# Patient Record
Sex: FEMALE | Race: WHITE | Employment: PART TIME | URBAN - METROPOLITAN AREA
[De-identification: names, ages, dates, MRNs, and addresses within clinical notes are randomized per-mention and may not be internally consistent; named-entity substitution may affect disease eponyms.]

---

## 2017-01-24 ENCOUNTER — TELEPHONE (OUTPATIENT)
Dept: FAMILY MEDICINE | Facility: CLINIC | Age: 52
End: 2017-01-24

## 2017-01-24 DIAGNOSIS — F41.9 ANXIETY: Primary | ICD-10-CM

## 2017-01-25 NOTE — TELEPHONE ENCOUNTER
Ativan      Last Written Prescription Date: 09/22/2016  Last Fill Quantity: 30, # refills: 1  Last Office Visit with [facility] primary care provider:  10/21/2016        Last PHQ-9 score on record=   PHQ-9 SCORE 10/21/2016   Total Score -   Total Score -   Total Score 2

## 2017-01-27 RX ORDER — LORAZEPAM 0.5 MG/1
TABLET ORAL
Qty: 30 TABLET | Refills: 0 | Status: SHIPPED | OUTPATIENT
Start: 2017-01-27 | End: 2017-04-07

## 2017-01-27 NOTE — TELEPHONE ENCOUNTER
Routing refill request to provider for review/approval because:  Drug not on the [facility] refill protocol         Disp Refills Start End AKILAH     LORazepam (ATIVAN) 0.5 MG tablet 30 tablet 1 9/22/2016  No     Sig: TAKE ONE-HALF TO ONE TABLET BY MOUTH EVERY EIGHT HOURS AS NEEDED FOR ANXIETY.     Class: Local Print     Order: [id number]       Pharmacy      [id number] - [place], [place] - [address]       Associated Diagnoses      Anxiety [F41.9]  - Primary

## 2017-03-16 ENCOUNTER — ALLIED HEALTH/NURSE VISIT (OUTPATIENT)
Dept: NURSING | Facility: CLINIC | Age: 52
End: 2017-03-16
Payer: COMMERCIAL

## 2017-03-16 VITALS
SYSTOLIC BLOOD PRESSURE: 138 MMHG | BODY MASS INDEX: 25.49 KG/M2 | HEIGHT: 61 IN | WEIGHT: 135 LBS | DIASTOLIC BLOOD PRESSURE: 90 MMHG | HEART RATE: 80 BPM

## 2017-03-16 DIAGNOSIS — I10 HTN (HYPERTENSION): Primary | ICD-10-CM

## 2017-03-16 PROCEDURE — 99207 ZZC NO CHARGE NURSE ONLY: CPT

## 2017-03-16 NOTE — MR AVS SNAPSHOT
After Visit Summary   3/16/2017    iVlma Barber    MRN: [id number]           Patient Information     Date Of Birth          1965        Visit Information        Provider Department      3/16/2017 2:00 PM NE RN [facility] Instructions    Limiting your intake of salt (sodium) to 1,500-2,000mg per day will help lower your blood pressure.  One great way to do that is using spices for flavor and reading labels to spot hidden sodium in processed and canned foods.  For more information and some great recipe ideas you can visit MRS. KRISHNAMURTHY  salt free seasoning at http://www.[employer]/?s_cid=c3a:google:brand:mrs+dash  (She even has a FACEBOOK page).    We also talked about portion control and how adding more whole grains, fresh fruits and vegetables can help you lose weight which has a direct effect on lowering your blood pressure.  Try taking the  Portion Distortion Quiz  at http://nm9412.nhlbihin.net/portion/portion.cgi?action=question&number=1 it s only 11 questions.    Think of meat as a side dish rather than the main course by loading up on veggies and healthy grains.  You ll feel full sooner and longer.  This is a great opportunity to try new vegetables and preparation methods.  Have fun with this!  Stress and how by itself has not been proved to cause long-term high blood pressure, other behaviors linked to stress - such as overeating, drinking alcohol and poor sleep habits - can cause high blood pressure.      Stressful events are a fact of life and you may not be able to change your current situation.  Identifying what stresses you out and how you respond to that stress is the key to learning how you can take care of yourself physically and emotionally.  Check out the American Heart Association for more useful information at...   http://www.americanheart.org/presenter.jhtml?ygybophlfx=6030900    Eating a healthy diet, getting some exercise, using relaxation  "techniques, proper sleep, and counseling are ways to help decrease stress in your life.  Be good to yourself!  Home Remedy Treatments for Insomnia  Sleepless night after sleepless night can be downright unbearable. Discover some helpful home remedies for getting the sleep you need tonight -- and every night.  Don't torture yourself. The worst thing that an insomniac can do is to lie in bed tossing and turning. If you can't fall asleep after 15 to 20 minutes, get up and do a quiet activity, such as reading, watching TV, or listening to relaxing music. Then, go back to bed and try again.  Take a hot bath.  A 20-30 minute hot bath taken two hours before bedtime is a wonderful way to relax your body and make it ready for sleep. For most people, taking a bath closer to bedtime may be stimulating and may delay sleep (of course, there are always exceptions, so experiment with the timing if you need to).    Your temperature naturally dips at night, starting two hours before sleep and bottoming out at 4 a.m. or 5 a.m., according to a 1997 study conducted by [facility]. When you soak in a hot tub, your temperature rises--and the rapid cool-down period immediately afterward relaxes you.     Katey Antonio, PhD,  at [employer] says, \"If you raise your temperature a degree or two with a bath, the steeper drop at bedtime is more likely to put you in a deep sleep,\" A shower is less effective but can work, as well.    Try to maintain a normal schedule. Perhaps the most important rule for people with insomnia is to keep a strict sleep-wake schedule, even on weekends. If you can't sleep one night, get up at your usual time the next morning and don't take any naps. Chances are you'll be ready for a sound sleep by the next night.  Say no to naps. If you nap, you'll have more trouble getting to sleep the next night, thereby compounding your insomnia. It's best " to let yourself get good and sleepy so that it will be easier to get to sleep the next night.  Try earplugs. Sometimes, insomnia is caused by being awakened repeatedly by loud noises. Often, the sleeper is not aware of what awakened them. Try sleeping in a quieter room, or wear earplugs.  Exercise. Doing aerobic exercise, such as walking, cycling, jogging, or swimming, helps with sleep. Don't exercise too close to bedtime, though -- exercising in the morning or afternoon is best.  Get a comfortable bed and pillows. Sleep may elude you if your bed is too hard or too soft, or if your pillows aren't just right.  Don't drink alcohol. Although alcohol can make you feel drowsy and may actually put you to sleep, it has the unpleasant side effect of waking you up later on in the night with a headache, stomachache, or full bladder. In addition, once alcohol's sedative effect wears off, there's a rebound effect that actually makes you more likely to have trouble falling back to sleep.  Cut down on caffeine. Caffeine, by its nature, stimulates your brain. Limit your coffee intake to two cups a day. Starting at noon, consume no foods or beverages that contain caffeine.  Don't switch beds or move to the couch. It is important to associate your bed, and only your bed, with sleep.  Confine work to the office. Use your bedroom only for sleep and sex. No work, no eating, no television, and no arguing with your bed partner.             Establish a relaxing bedtime ritual. When mothers bathe their children or read to them every night before bedtime, they are reinforcing a signal that it's time to settle down and get ready for sleep. Establishing such a ritual may also be helpful for adults.  Prepare your bedroom for sleep. The best sleep environment is one that is dark, quiet, comfortable, and cool, according to the National Sleep Foundation.  Don't eat before bed. Finish eating two or three hours before bedtime.  Physical activity  not only helps control your blood pressure but also helps manage your weight, strengthen your heart and manage your stress level.  Any activity you can add to your day is helpful.  American Heart Association has some good suggestions at http://www.americanheart.org/presenter.jhtml?vfenfcsrkl=4613714    Ideally you will work your way up to 30 minutes of moderate exercise - such as brisk walking - per day, at least 5 days a week.  If you want to take three 10 minute walks, or two 15 minute brisk walks, per day that is ok.  You re working too hard if you get out of breath quickly and have to stop to catch your breath or short sentences feel like a strain.    Warming up before and cooling down after exercising helps decrease your risk of injury and soreness.  A good rule of thumb to remember is,  Do slowly what you are about to do quickly.   Don't forget to breathe!  Holding your breath can increase your blood pressure.  Find your rhythm!          Follow-ups after your visit        Your next 10 appointments already scheduled     Mar 17, 2017  9:45 AM CDT   SHORT with Alix Ortiz MD   [facility] ([facility]    [address] [place] [id number] [phone]              Who to contact     If you have questions or need follow up information about today's clinic visit or your schedule please contact [facility] directly at [phone].  Normal or non-critical lab and imaging results will be communicated to you by MyChart, letter or phone within 4 business days after the clinic has received the results. If you do not hear from us within 7 days, please contact the clinic through MyChart or phone. If you have a critical or abnormal lab result, we will notify you by phone as soon as possible.  Submit refill requests through [facility] or call your pharmacy and they will forward the refill request to us. Please allow 3 business days for your refill to be completed.  "         Additional Information About Your Visit        MyChart Information     [facility] lets you send messages to your doctor, view your test results, renew your prescriptions, schedule appointments and more. To sign up, go to [email or web address] . Click on \"Log in\" on the left side of the screen, which will take you to the Welcome page. Then click on \"Sign up Now\" on the right side of the page.     You will be asked to enter the access code listed below, as well as some personal information. Please follow the directions to create your username and password.     Your access code is: [email or web address]  Expires: 2017  2:28 PM     Your access code will  in 90 days. If you need help or a new code, please call your [facility] clinic or [phone].        Care EveryWhere ID     This is your Care EveryWhere ID. This could be used by other organizations to access your [facility] medical records  [id number]        Your Vitals Were     Pulse Height BMI (Body Mass Index)             80 5' 1.02\" (1.55 m) 25.49 kg/m2          Blood Pressure from Last 3 Encounters:   17 138/90   10/21/16 122/88   16 118/78    Weight from Last 3 Encounters:   17 135 lb (61.2 kg)   10/21/16 142 lb 12.8 oz (64.8 kg)   16 142 lb (64.4 kg)              Today, you had the following     No orders found for display       Primary Care Provider Office Phone # Fax #    Angela RODGER Hernandez [facility] [phone]       [address] [address]        Thank you!     Thank you for choosing [facility]  for your care. Our goal is always to provide you with excellent care. Hearing back from our patients is one way we can continue to improve our services. Please take a few minutes to complete the written survey that you may receive in the mail after your visit with us. Thank you!             Your Updated Medication List - Protect others around you: Learn " how to safely use, store and throw away your medicines at www.disposemymeds.org.          This list is accurate as of: 3/16/17  2:29 PM.  Always use your most recent med list.                   Brand Name Dispense Instructions for use    aspirin 81 MG tablet      Take 81 mg by mouth daily       cyclobenzaprine 10 MG tablet    FLEXERIL    90 tablet    Take 0.5 tablets (5 mg) by mouth nightly as needed for muscle spasms       DULoxetine 20 MG EC capsule    CYMBALTA    60 capsule    Take 1 capsule (20 mg) by mouth 2 times daily       hydrochlorothiazide 12.5 MG Tabs tablet     90 tablet    Take 1 tablet (12.5 mg) by mouth daily       isometheptene-dichloralphenazone-acetaminophen -325 MG per capsule    MIDRIN    90 capsule    Take 1 capsule by mouth every 4 hours as needed for headaches       LORazepam 0.5 MG tablet    ATIVAN    30 tablet    TAKE 1/2 TO 1 TABLET BY MOUTH EVERY 8 HOURS AS NEEDED FOR ANXIETY       omeprazole 20 MG CR capsule    priLOSEC    90 capsule    Take 1 capsule (20 mg) by mouth daily       tiZANidine 2 MG tablet    ZANAFLEX    30 tablet    Take 1-2 tablets (2-4 mg) by mouth 3 times daily as needed for muscle spasms

## 2017-03-16 NOTE — Clinical Note
Patient comes in concerned about her blood pressure, numbers at home elevated 145/98 she has some anxiety going on, worries, tired, unable to sleep apt made with you for 9:45 tomorrow. Keri Ruiz,Clinic Rn [facility] [place]

## 2017-03-16 NOTE — PATIENT INSTRUCTIONS
Limiting your intake of salt (sodium) to 1,500-2,000mg per day will help lower your blood pressure.  One great way to do that is using spices for flavor and reading labels to spot hidden sodium in processed and canned foods.  For more information and some great recipe ideas you can visit MRS. KRISHNAMURTHY  salt free seasoning at http://www.[employer]/?s_cid=c3a:google:brand:mrs+dash  (She even has a FACEBOOK page).    We also talked about portion control and how adding more whole grains, fresh fruits and vegetables can help you lose weight which has a direct effect on lowering your blood pressure.  Try taking the  Portion Distortion Quiz  at http://da8996.nhlbihin.net/portion/portion.cgi?action=question&number=1 it s only 11 questions.    Think of meat as a side dish rather than the main course by loading up on veggies and healthy grains.  You ll feel full sooner and longer.  This is a great opportunity to try new vegetables and preparation methods.  Have fun with this!  Stress and how by itself has not been proved to cause long-term high blood pressure, other behaviors linked to stress - such as overeating, drinking alcohol and poor sleep habits - can cause high blood pressure.      Stressful events are a fact of life and you may not be able to change your current situation.  Identifying what stresses you out and how you respond to that stress is the key to learning how you can take care of yourself physically and emotionally.  Check out the American Heart Association for more useful information at...   http://www.americanheart.org/presenter.jhtml?mcyrbzzigg=0664188    Eating a healthy diet, getting some exercise, using relaxation techniques, proper sleep, and counseling are ways to help decrease stress in your life.  Be good to yourself!  Home Remedy Treatments for Insomnia  Sleepless night after sleepless night can be downright unbearable. Discover some helpful home remedies for getting the sleep you need tonight -- and  "every night.  Don't torture yourself. The worst thing that an insomniac can do is to lie in bed tossing and turning. If you can't fall asleep after 15 to 20 minutes, get up and do a quiet activity, such as reading, watching TV, or listening to relaxing music. Then, go back to bed and try again.  Take a hot bath.  A 20-30 minute hot bath taken two hours before bedtime is a wonderful way to relax your body and make it ready for sleep. For most people, taking a bath closer to bedtime may be stimulating and may delay sleep (of course, there are always exceptions, so experiment with the timing if you need to).    Your temperature naturally dips at night, starting two hours before sleep and bottoming out at 4 a.m. or 5 a.m., according to a 1997 study conducted by [facility]. When you soak in a hot tub, your temperature rises--and the rapid cool-down period immediately afterward relaxes you.     Katey Antonio, PhD,  at [employer] says, \"If you raise your temperature a degree or two with a bath, the steeper drop at bedtime is more likely to put you in a deep sleep,\" A shower is less effective but can work, as well.    Try to maintain a normal schedule. Perhaps the most important rule for people with insomnia is to keep a strict sleep-wake schedule, even on weekends. If you can't sleep one night, get up at your usual time the next morning and don't take any naps. Chances are you'll be ready for a sound sleep by the next night.  Say no to naps. If you nap, you'll have more trouble getting to sleep the next night, thereby compounding your insomnia. It's best to let yourself get good and sleepy so that it will be easier to get to sleep the next night.  Try earplugs. Sometimes, insomnia is caused by being awakened repeatedly by loud noises. Often, the sleeper is not aware of what awakened them. Try sleeping in a quieter room, or wear " earplugs.  Exercise. Doing aerobic exercise, such as walking, cycling, jogging, or swimming, helps with sleep. Don't exercise too close to bedtime, though -- exercising in the morning or afternoon is best.  Get a comfortable bed and pillows. Sleep may elude you if your bed is too hard or too soft, or if your pillows aren't just right.  Don't drink alcohol. Although alcohol can make you feel drowsy and may actually put you to sleep, it has the unpleasant side effect of waking you up later on in the night with a headache, stomachache, or full bladder. In addition, once alcohol's sedative effect wears off, there's a rebound effect that actually makes you more likely to have trouble falling back to sleep.  Cut down on caffeine. Caffeine, by its nature, stimulates your brain. Limit your coffee intake to two cups a day. Starting at noon, consume no foods or beverages that contain caffeine.  Don't switch beds or move to the couch. It is important to associate your bed, and only your bed, with sleep.  Confine work to the office. Use your bedroom only for sleep and sex. No work, no eating, no television, and no arguing with your bed partner.             Establish a relaxing bedtime ritual. When mothers bathe their children or read to them every night before bedtime, they are reinforcing a signal that it's time to settle down and get ready for sleep. Establishing such a ritual may also be helpful for adults.  Prepare your bedroom for sleep. The best sleep environment is one that is dark, quiet, comfortable, and cool, according to the National Sleep Foundation.  Don't eat before bed. Finish eating two or three hours before bedtime.  Physical activity not only helps control your blood pressure but also helps manage your weight, strengthen your heart and manage your stress level.  Any activity you can add to your day is helpful.  American Heart Association has some good suggestions at  http://www.americanheart.org/presenter.jhtml?qfyaspspdy=8529494    Ideally you will work your way up to 30 minutes of moderate exercise - such as brisk walking - per day, at least 5 days a week.  If you want to take three 10 minute walks, or two 15 minute brisk walks, per day that is ok.  You re working too hard if you get out of breath quickly and have to stop to catch your breath or short sentences feel like a strain.    Warming up before and cooling down after exercising helps decrease your risk of injury and soreness.  A good rule of thumb to remember is,  Do slowly what you are about to do quickly.   Don't forget to breathe!  Holding your breath can increase your blood pressure.  Find your rhythm!

## 2017-03-16 NOTE — PROGRESS NOTES
DATA: PCP: Angel Dover    Indications for Blood Pressure (BP) monitoring:     High readings.    ACTION: Signs and Symptoms:  Headaches?: no  Chest pain?: tight like anxiety   Shortness of breath?: little bit   Edema?: no  Visual problems?: no  Parathesia?: no  Epitaxis?: no  Dizziness?: off and on   Hematuria?: no    BP:   BP Readings from Last 1 Encounters:   10/21/16 122/88     Data Unavailable     Diabetic?: no  Heart Disease?: no  Smoking?: no  Alcohol usage?: no  Low sodium diet?: no  Exercise?: no  Checks blood pressure at home?: yes    Current 3 readings: 145/98 137/91  *BP is 130/80 or greater for diabetics or heart disease? no  *BP is 140/90 or greater for non-diabetics? no  *Pulse under 60? no  *Pulse over 110? no    Discussed with patient symptoms of high blood pressure, best ways to measure blood pressure, how blood pressure affects health, risk factors for high blood pressure, and lifestyle changes to help prevent/control high blood pressure.        RESPONSE: Patient verbalizes understanding, denies questions or concerns, and agrees with plan.         PLAN: Patient left in ambulatory condition. Will call or follow-up in clinic during the interim as needed.        CC: Chart to Dr Ortiz                          145/98

## 2017-03-17 ENCOUNTER — OFFICE VISIT (OUTPATIENT)
Dept: FAMILY MEDICINE | Facility: CLINIC | Age: 52
End: 2017-03-17
Payer: COMMERCIAL

## 2017-03-17 VITALS
BODY MASS INDEX: 24.66 KG/M2 | TEMPERATURE: 97.1 F | OXYGEN SATURATION: 100 % | HEIGHT: 62 IN | SYSTOLIC BLOOD PRESSURE: 138 MMHG | RESPIRATION RATE: 14 BRPM | HEART RATE: 66 BPM | WEIGHT: 134 LBS | DIASTOLIC BLOOD PRESSURE: 84 MMHG

## 2017-03-17 DIAGNOSIS — R10.11 ABDOMINAL PAIN, RIGHT UPPER QUADRANT: ICD-10-CM

## 2017-03-17 DIAGNOSIS — F41.9 ANXIETY: ICD-10-CM

## 2017-03-17 DIAGNOSIS — E61.1 IRON DEFICIENCY: ICD-10-CM

## 2017-03-17 DIAGNOSIS — Z13.6 CARDIOVASCULAR SCREENING; LDL GOAL LESS THAN 160: ICD-10-CM

## 2017-03-17 DIAGNOSIS — Z12.31 VISIT FOR SCREENING MAMMOGRAM: ICD-10-CM

## 2017-03-17 DIAGNOSIS — G43.C0 PERIODIC HEADACHE SYNDROME, NOT INTRACTABLE: ICD-10-CM

## 2017-03-17 DIAGNOSIS — E80.4 GILBERT'S DISEASE: ICD-10-CM

## 2017-03-17 DIAGNOSIS — I10 BENIGN ESSENTIAL HYPERTENSION: Primary | ICD-10-CM

## 2017-03-17 LAB
ALBUMIN SERPL-MCNC: 4.1 G/DL (ref 3.4–5)
ALP SERPL-CCNC: 46 U/L (ref 40–150)
ALT SERPL W P-5'-P-CCNC: 25 U/L (ref 0–50)
ANION GAP SERPL CALCULATED.3IONS-SCNC: 8 MMOL/L (ref 3–14)
AST SERPL W P-5'-P-CCNC: 12 U/L (ref 0–45)
BASOPHILS # BLD AUTO: 0 10E9/L (ref 0–0.2)
BASOPHILS NFR BLD AUTO: 0.2 %
BILIRUB SERPL-MCNC: 1.8 MG/DL (ref 0.2–1.3)
BUN SERPL-MCNC: 13 MG/DL (ref 7–30)
CALCIUM SERPL-MCNC: 9.6 MG/DL (ref 8.5–10.1)
CHLORIDE SERPL-SCNC: 101 MMOL/L (ref 94–109)
CHOLEST SERPL-MCNC: 176 MG/DL
CO2 SERPL-SCNC: 32 MMOL/L (ref 20–32)
CREAT SERPL-MCNC: 0.6 MG/DL (ref 0.52–1.04)
DIFFERENTIAL METHOD BLD: NORMAL
EOSINOPHIL # BLD AUTO: 0.1 10E9/L (ref 0–0.7)
EOSINOPHIL NFR BLD AUTO: 1 %
ERYTHROCYTE [DISTWIDTH] IN BLOOD BY AUTOMATED COUNT: 12.2 % (ref 10–15)
GFR SERPL CREATININE-BSD FRML MDRD: ABNORMAL ML/MIN/1.7M2
GLUCOSE SERPL-MCNC: 93 MG/DL (ref 70–99)
HCT VFR BLD AUTO: 36 % (ref 35–47)
HDLC SERPL-MCNC: 52 MG/DL
HGB BLD-MCNC: 12.5 G/DL (ref 11.7–15.7)
LDLC SERPL CALC-MCNC: 85 MG/DL
LYMPHOCYTES # BLD AUTO: 1.9 10E9/L (ref 0.8–5.3)
LYMPHOCYTES NFR BLD AUTO: 39.1 %
MCH RBC QN AUTO: 30.3 PG (ref 26.5–33)
MCHC RBC AUTO-ENTMCNC: 34.7 G/DL (ref 31.5–36.5)
MCV RBC AUTO: 87 FL (ref 78–100)
MONOCYTES # BLD AUTO: 0.4 10E9/L (ref 0–1.3)
MONOCYTES NFR BLD AUTO: 8.9 %
NEUTROPHILS # BLD AUTO: 2.4 10E9/L (ref 1.6–8.3)
NEUTROPHILS NFR BLD AUTO: 50.8 %
NONHDLC SERPL-MCNC: 124 MG/DL
PLATELET # BLD AUTO: 217 10E9/L (ref 150–450)
POTASSIUM SERPL-SCNC: 4.5 MMOL/L (ref 3.4–5.3)
PROT SERPL-MCNC: 7.4 G/DL (ref 6.8–8.8)
RBC # BLD AUTO: 4.12 10E12/L (ref 3.8–5.2)
SODIUM SERPL-SCNC: 141 MMOL/L (ref 133–144)
TRIGL SERPL-MCNC: 195 MG/DL
WBC # BLD AUTO: 4.8 10E9/L (ref 4–11)

## 2017-03-17 PROCEDURE — 99214 OFFICE O/P EST MOD 30 MIN: CPT | Performed by: INTERNAL MEDICINE

## 2017-03-17 PROCEDURE — 36415 COLL VENOUS BLD VENIPUNCTURE: CPT | Performed by: INTERNAL MEDICINE

## 2017-03-17 PROCEDURE — 80053 COMPREHEN METABOLIC PANEL: CPT | Performed by: INTERNAL MEDICINE

## 2017-03-17 PROCEDURE — 85025 COMPLETE CBC W/AUTO DIFF WBC: CPT | Performed by: INTERNAL MEDICINE

## 2017-03-17 PROCEDURE — 80061 LIPID PANEL: CPT | Performed by: INTERNAL MEDICINE

## 2017-03-17 RX ORDER — HYDROCHLOROTHIAZIDE 25 MG/1
25 TABLET ORAL DAILY
Qty: 90 TABLET | Refills: 0 | Status: SHIPPED | OUTPATIENT
Start: 2017-03-17 | End: 2017-04-07

## 2017-03-17 ASSESSMENT — PAIN SCALES - GENERAL: PAINLEVEL: MODERATE PAIN (5)

## 2017-03-17 NOTE — PATIENT INSTRUCTIONS
- Start getting more exercise in your free time.     - Start taking 25 MG Hydrochlorothiazide. You can take two of your 12.5 MG tablets too.     - Follow up with me in 2-3 weeks.       [facility]    If you have any questions regarding to your visit please contact your care team:     Team Pink:   Clinic Hours Telephone Number   Internal Medicine:  Dr. Alix Reilly, NP       7am-7pm  Monday - Thursday   7am-5pm  Fridays  [phone]  (Appointment scheduling available 24/7)    Questions about your visit?  Team Line  [phone]   [facility] [place] [facility]pm Monday-Friday Saturday-Sunday- 9am-5pm   [place] - 5pm-9pm Monday-Friday Saturday-Sunday- 9am-5pm  [phone] - [place]   [phone] - [place]       What options do I have for visits at the clinic other than the traditional office visit?  To expand how we care for you, many of our providers are utilizing electronic visits (e-visits) and telephone visits, when medically appropriate, for interactions with their patients rather than a visit in the clinic.   We also offer nurse visits for many medical concerns. Just like any other service, we will bill your insurance company for this type of visit based on time spent on the phone with your provider. Not all insurance companies cover these visits. Please check with your medical insurance if this type of visit is covered. You will be responsible for any charges that are not paid by your insurance.      E-visits via [employer]:  generally incur a $35.00 fee.  Telephone visits:  Time spent on the phone: *charged based on time that is spent on the phone in increments of 10 minutes. Estimated cost:   5-10 mins $30.00   11-20 mins. $59.00   21-30 mins. $85.00   Use [employer] (secure email communication and access to your chart) to send your primary care provider a message or make an appointment. Ask someone on your Team how to sign  up for [employer].    For a Price Quote for your services, please call our Consumer Price Line at [phone].    As always, Thank you for trusting us with your health care needs!    Discharged by Cici JHA CMA ([facility])

## 2017-03-17 NOTE — MR AVS SNAPSHOT
After Visit Summary   3/17/2017    Vilma Barber    MRN: [id number]           Patient Information     Date Of Birth          1965        Visit Information        Provider Department      3/17/2017 9:45 AM Alix Ortiz MD [facility]        Today's Diagnoses     Benign essential hypertension    -  1    Visit for screening mammogram        Abdominal pain, right upper quadrant        CARDIOVASCULAR SCREENING; LDL GOAL LESS THAN 160        Iron deficiency        Gilbert's disease        Periodic headache syndrome, not intractable          Care Instructions    - Start getting more exercise in your free time.     - Start taking 25 MG Hydrochlorothiazide. You can take two of your 12.5 MG tablets too.     - Follow up with me in 2-3 weeks.       [facility] (formerly [facility])    If you have any questions regarding to your visit please contact your care team:     Team Pink:   Clinic Hours Telephone Number   Internal Medicine:  Dr. Alix Reilly, NP       7am-7pm  Monday - Thursday   7am-5pm  Fridays  [phone]  (Appointment scheduling available 24/7)    Questions about your visit?  Team Line  [phone]   Urgent Care - [place] and [place] - 11am-9pm Monday-Friday Saturday-Sunday- 9am-5pm   [place] - 5pm-9pm Monday-Friday Saturday-Sunday- 9am-5pm  [phone] - [place]   [phone] - [place]       What options do I have for visits at the clinic other than the traditional office visit?  To expand how we care for you, many of our providers are utilizing electronic visits (e-visits) and telephone visits, when medically appropriate, for interactions with their patients rather than a visit in the clinic.   We also offer nurse visits for many medical concerns. Just like any other service, we will bill your insurance company for this type of visit based on time spent on the phone with your provider. Not all insurance companies  cover these visits. Please check with your medical insurance if this type of visit is covered. You will be responsible for any charges that are not paid by your insurance.      E-visits via [employer]:  generally incur a $35.00 fee.  Telephone visits:  Time spent on the phone: *charged based on time that is spent on the phone in increments of 10 minutes. Estimated cost:   5-10 mins $30.00   11-20 mins. $59.00   21-30 mins. $85.00   Use [employer] (secure email communication and access to your chart) to send your primary care provider a message or make an appointment. Ask someone on your Team how to sign up for [employer]).    For a Price Quote for your services, please call our Multistat Line at [phone].    As always, Thank you for trusting us with your health care needs!    Discharged by Cici JHA CMA ([facility])          Follow-ups after your visit        Future tests that were ordered for you today     Open Future Orders        Priority Expected Expires Ordered    MA SCREENING DIGITAL BILAT - Future  (s+30) Routine  3/17/2018 3/17/2017            Who to contact     If you have questions or need follow up information about today's clinic visit or your schedule please contact [facility] directly at [phone].  Normal or non-critical lab and imaging results will be communicated to you by [employer], letter or phone within 4 business days after the clinic has received the results. If you do not hear from us within 7 days, please contact the clinic through [employer] or phone. If you have a critical or abnormal lab result, we will notify you by phone as soon as possible.  Submit refill requests through [employer] or call your pharmacy and they will forward the refill request to us. Please allow 3 business days for your refill to be completed.          Additional Information About Your Visit        [employer] Information     [employer] lets you send messages to your doctor, view your test results, renew your  "prescriptions, schedule appointments and more. To sign up, go to [email or web address] . Click on \"Log in\" on the left side of the screen, which will take you to the Welcome page. Then click on \"Sign up Now\" on the right side of the page.     You will be asked to enter the access code listed below, as well as some personal information. Please follow the directions to create your username and password.     Your access code is: [email or web address]  Expires: 2017  2:28 PM     Your access code will  in 90 days. If you need help or a new code, please call your [place] clinic or [phone].        Care EveryWhere ID     This is your Care EveryWhere ID. This could be used by other organizations to access your [place] medical records  [id number]        Your Vitals Were     Pulse Temperature Respirations Height Last Period Pulse Oximetry    66 97.1  F (36.2  C) (Oral) 14 5' 1.5\" (1.562 m) 2017 (Approximate) 100%    Breastfeeding? BMI (Body Mass Index)                No 24.91 kg/m2           Blood Pressure from Last 3 Encounters:   17 138/84   17 138/90   10/21/16 122/88    Weight from Last 3 Encounters:   17 134 lb (60.8 kg)   17 135 lb (61.2 kg)   10/21/16 142 lb 12.8 oz (64.8 kg)              We Performed the Following     CBC with platelets differential     Comprehensive metabolic panel     JUST IN CASE     Lipid panel reflex to direct LDL          Today's Medication Changes          These changes are accurate as of: 3/17/17 10:38 AM.  If you have any questions, ask your nurse or doctor.               These medicines have changed or have updated prescriptions.        Dose/Directions    hydrochlorothiazide 25 MG tablet   Commonly known as:  HYDRODIURIL   This may have changed:    - medication strength  - how much to take   Used for:  Benign essential hypertension   Changed by:  Alix Ortiz MD        Dose:  25 mg   Take 1 tablet (25 mg) by mouth daily   Quantity:  90 tablet "   Refills:  0            Where to get your medicines      These medications were sent to [facility] [address] [facility] - [place], [place] - [address]  [address] [place] [address]     Phone:  [phone]     hydrochlorothiazide 25 MG tablet                Primary Care Provider Office Phone # Fax #    ROD[facility] [phone]       [facility] [address]  [place] [address]        Thank you!     Thank you for choosing [facility]  for your care. Our goal is always to provide you with excellent care. Hearing back from our patients is one way we can continue to improve our services. Please take a few minutes to complete the written survey that you may receive in the mail after your visit with us. Thank you!             Your Updated Medication List - Protect others around you: Learn how to safely use, store and throw away your medicines at www.disposemymeds.org.          This list is accurate as of: 3/17/17 10:38 AM.  Always use your most recent med list.                   Brand Name Dispense Instructions for use    aspirin 81 MG tablet      Take 81 mg by mouth daily       cyclobenzaprine 10 MG tablet    FLEXERIL    90 tablet    Take 0.5 tablets (5 mg) by mouth nightly as needed for muscle spasms       hydrochlorothiazide 25 MG tablet    HYDRODIURIL    90 tablet    Take 1 tablet (25 mg) by mouth daily       isometheptene-dichloralphenazone-acetaminophen -325 MG per capsule    MIDRIN    90 capsule    Take 1 capsule by mouth every 4 hours as needed for headaches       LORazepam 0.5 MG tablet    ATIVAN    30 tablet    TAKE 1/2 TO 1 TABLET BY MOUTH EVERY 8 HOURS AS NEEDED FOR ANXIETY       omeprazole 20 MG CR capsule    priLOSEC    90 capsule    Take 1 capsule (20 mg) by mouth daily       tiZANidine 2 MG tablet    ZANAFLEX    30 tablet    Take 1-2 tablets (2-4 mg) by mouth 3 times daily as needed for muscle spasms

## 2017-03-17 NOTE — NURSING NOTE
"Chief Complaint   Patient presents with     Hypertension       Initial /84  Pulse 66  Temp 97.1  F (36.2  C) (Oral)  Resp 14  Ht 5' 1.5\" (1.562 m)  Wt 134 lb (60.8 kg)  LMP 01/09/2017 (Approximate)  SpO2 100%  Breastfeeding? No  BMI 24.91 kg/m2 Estimated body mass index is 24.91 kg/(m^2) as calculated from the following:    Height as of this encounter: 5' 1.5\" (1.562 m).    Weight as of this encounter: 134 lb (60.8 kg).  Medication Reconciliation: complete   Rosanne Castillo CMA      "

## 2017-03-17 NOTE — PROGRESS NOTES
INTERNAL MEDICINE   SUBJECTIVE:                                                    Vilma Barber is a 51 year old female who presents to clinic today for the following health issues:    Hypertension Follow-up    Low Salt Diet: no added salt       Amount of exercise or physical activity: None    Problems taking medications regularly: No    Medication side effects: none    Diet: regular (no restrictions)      HPI: She has not been sleeping well for the last week. She explains that she wakes up after half an hour of sleep, and then stays awake for about 3 hours. She has tried taking Advil PM but without relief. She explains that this has been making her tired and make her heart pump fast. She notes that she is stressed as well. On Sunday she had a terrible headache and took Midrin without relief. She also took 2 Aleve and use hot packs on her forehead and neck and it eventually went away. She thinks that she might be grinding her teeth at night because sometimes she wakes up with a sore jaw. She tried using the mouthguard to help her sleep, but it wouldn't be in her mouth when she'd wake up. She was in the emergency room 2-3 months ago with very high blood pressure. She was told to have follow up with us, but she did not schedule an appointment. She notes that when she is stressed or bothered, her blood pressure will go up. She adds that in the Summer her second cousin passed away in August and she has had a hard time coping with her death. Even talking about her cousin gives her chest pain because she is so emotional. Her brother was also diagnosed with hydrocephalus and had surgery two weeks ago.     Additional Notes: She has had a puffiness in her right anterior chest over the lower rib cage. She explains that it is associated with pain and numbness. This has been going on for a while, and the last time she felt it was last night.     Problem list and histories reviewed & adjusted, as indicated.  Additional  history: as documented    Patient Active Problem List   Diagnosis     GERD (gastroesophageal reflux disease)     CARDIOVASCULAR SCREENING; LDL GOAL LESS THAN 160     Iron deficiency     Migraines     Gilbert's disease     Breast mass     Benign essential hypertension     Back pain     Family history of breast cancer in first degree relative     Neck pain     Anxiety     HTN (hypertension)     History reviewed. No pertinent past surgical history.    Social History   Substance Use Topics     Smoking status: Never Smoker     Smokeless tobacco: Never Used      Comment: smoke free household.     Alcohol use Yes      Comment: occ     Family History   Problem Relation Age of Onset     HEART DISEASE Brother      Asthma Son      Hypertension Mother      HEART DISEASE Mother      Glaucoma No family hx of      Macular Degeneration No family hx of      CANCER No family hx of      DIABETES No family hx of      Thyroid Disease No family hx of      Anesthesia Reaction No family hx of          Labs reviewed in EPIC    Reviewed and updated as needed this visit by clinical staff       Reviewed and updated as needed this visit by Provider       ROS:  C: NEGATIVE for fever, chills, change in weight. POSITIVE for headache.   R: NEGATIVE for significant cough or SOB  CV: NEGATIVE for chest pain, peripheral edema. POSITIVE for palpitations.   GI: NEGATIVE for nausea, abdominal pain, heartburn, or change in bowel habits  N: NEGATIVE for weakness, paresthesias. POSITIVE for occasional dizziness.   P: NEGATIVE for changes in mood or affect  All other systems reviewed and were negative.      This document serves as a record of the services and decisions personally performed and made by Alix Ortiz MD. It was created on his/her behalf by Monica Loaiza, a trained medical scribe. The creation of this document is based the provider's statements to the medical scribe.    Becky Loaiza 10:28 AM, March 17, 2017    OBJECTIVE:             "                                        /84  Pulse 66  Temp 97.1  F (36.2  C) (Oral)  Resp 14  Ht 1.562 m (5' 1.5\")  Wt 60.8 kg (134 lb)  LMP 01/09/2017 (Approximate)  SpO2 100%  Breastfeeding? No  BMI 24.91 kg/m2  Body mass index is 24.91 kg/(m^2).  GENERAL: healthy, alert and no distress  NECK: no adenopathy, no asymmetry, masses, or scars and thyroid normal to palpation  RESP: lungs clear to auscultation - no rales, rhonchi or wheezes  CV: regular rate and rhythm, normal S1 S2, no S3 or S4, no murmur, click or rub, no peripheral edema and peripheral pulses strong  ABDOMEN: soft, nontender, no hepatosplenomegaly, no masses and bowel sounds normal  MS: no gross musculoskeletal defects noted, no edema  PSYCH: mentation appears normal, affect normal/bright    Diagnostic Test Results:  none      ASSESSMENT/PLAN:                                                    I spent 16 minutes of time with the patient and >50% of it was in education and counseling regarding hypertension treatment and management.     1. Visit for screening mammogram   Patient will schedule appointment   - MA SCREENING DIGITAL BILAT - Future  (s+30); Future    2. Benign essential hypertension   Patient will start taking 25 MG instead of 12.5 MG daily. She will follow up with me in 2-3 weeks   - hydrochlorothiazide (HYDRODIURIL) 25 MG tablet; Take 1 tablet (25 mg) by mouth daily  Dispense: 90 tablet; Refill: 0    3. Abdominal pain, right upper quadrant  Unclear etiology.    - CBC with platelets differential  - Comprehensive metabolic panel    4. CARDIOVASCULAR SCREENING; LDL GOAL LESS THAN 160    - Lipid panel reflex to direct LDL    5. Iron deficiency    - CBC with platelets differential  - JUST IN CASE    6. Gilbert's disease  Stable.  Explained benign nature to patient in the past.      7. Periodic headache syndrome, not intractable  She has mixed tension and migraine.  Could try triptans again (low dose) once her blood pressure is under " better control.    Anxiety - exercise will help. Per patient instructions.       Patient Instructions   - Start getting more exercise in your free time.     - Start taking 25 MG Hydrochlorothiazide. You can take two of your 12.5 MG tablets too.     - Follow up with me in 2-3 weeks.       The information in this document, created by the medical scribe for me, accurately reflects the services I personally performed and the decisions made by me. I have reviewed and approved this document for accuracy prior to leaving the patient care area.  Alix Ortiz MD  10:28 AM, 03/17/17    Alix Ortiz MD  [facility]    Start: 10:22 AM   End: 10:38 AM

## 2017-03-17 NOTE — LETTER
[address] [place] [address]    March 20, 2017    Vilma Barber  [address] [place] [address]  [place] [address]          Dear Monique Esparza. Normal electrolytes. Normal kidney function. Normal liver blood test. Normal blood count    Enclosed is a copy of your results.     Results for orders placed or performed in visit on 03/17/17   Lipid panel reflex to direct LDL   Result Value Ref Range    Cholesterol 176 <200 mg/dL    Triglycerides 195 (H) <150 mg/dL    HDL Cholesterol 52 >49 mg/dL    LDL Cholesterol Calculated 85 <100 mg/dL    Non HDL Cholesterol 124 <130 mg/dL   CBC with platelets differential   Result Value Ref Range    WBC 4.8 4.0 - 11.0 10e9/L    RBC Count 4.12 3.8 - 5.2 10e12/L    Hemoglobin 12.5 11.7 - 15.7 g/dL    Hematocrit 36.0 35.0 - 47.0 %    MCV 87 78 - 100 fl    MCH 30.3 26.5 - 33.0 pg    MCHC 34.7 31.5 - 36.5 g/dL    RDW 12.2 10.0 - 15.0 %    Platelet Count 217 150 - 450 10e9/L    Diff Method Automated Method     % Neutrophils 50.8 %    % Lymphocytes 39.1 %    % Monocytes 8.9 %    % Eosinophils 1.0 %    % Basophils 0.2 %    Absolute Neutrophil 2.4 1.6 - 8.3 10e9/L    Absolute Lymphocytes 1.9 0.8 - 5.3 10e9/L    Absolute Monocytes 0.4 0.0 - 1.3 10e9/L    Absolute Eosinophils 0.1 0.0 - 0.7 10e9/L    Absolute Basophils 0.0 0.0 - 0.2 10e9/L   Comprehensive metabolic panel   Result Value Ref Range    Sodium 141 133 - 144 mmol/L    Potassium 4.5 3.4 - 5.3 mmol/L    Chloride 101 94 - 109 mmol/L    Carbon Dioxide 32 20 - 32 mmol/L    Anion Gap 8 3 - 14 mmol/L    Glucose 93 70 - 99 mg/dL    Urea Nitrogen 13 7 - 30 mg/dL    Creatinine 0.60 0.52 - 1.04 mg/dL    GFR Estimate >90  Non  GFR Calc   >60 mL/min/1.7m2    GFR Estimate If Black >90   GFR Calc   >60 mL/min/1.7m2    Calcium 9.6 8.5 - 10.1 mg/dL    Bilirubin Total 1.8 (H) 0.2 - 1.3 mg/dL    Albumin 4.1 3.4 - 5.0  g/dL    Protein Total 7.4 6.8 - 8.8 g/dL    Alkaline Phosphatase 46 40 - 150 U/L    ALT 25 0 - 50 U/L    AST 12 0 - 45 U/L       If you have any questions or concerns, please call myself or my nurse at [phone].      Sincerely,        Alix Ortiz MD/[place]

## 2017-03-17 NOTE — PROGRESS NOTES
Improved cholesterol. Normal electrolytes. Normal kidney function. Normal liver blood test. Normal blood count.

## 2017-03-22 ENCOUNTER — TELEPHONE (OUTPATIENT)
Dept: FAMILY MEDICINE | Facility: CLINIC | Age: 52
End: 2017-03-22

## 2017-03-22 DIAGNOSIS — Z12.31 VISIT FOR SCREENING MAMMOGRAM: Primary | ICD-10-CM

## 2017-03-22 NOTE — TELEPHONE ENCOUNTER
LM on [facility]  requesting a call back with clarification.  Patient has an order in [facility] for a regular Mammogram placed by Dr. Ortiz last week  Why does she need a diagnostic Mammogram?    Per Dr. Ortiz: a regular Mammogram should be fine    Clement Arechiga RN

## 2017-03-22 NOTE — TELEPHONE ENCOUNTER
Reason for Call: Request for an order or referral:    Order or referral being requested: Order for diagnostic imaging for breast    Date needed: as soon as possible    Has the patient been seen by the PCP for this problem? YES    Additional comments: Na    Phone number Patient can be reached at:  Other phone number:  [phone]    Fax# [phone]    Best Time:  anytime    Can we leave a detailed message on this number?  YES    Call taken on 3/22/2017 at 9:08 AM by Monika Geller

## 2017-03-23 NOTE — TELEPHONE ENCOUNTER
Pt. reports having intense pain in the left breast that extends into the armpit. She was advised due to the pain and family hx to have a diagnostic mammogram versus a regular mammogram.  [facility] imaging center felt she should have it done at [facility] instead - due to pain factor.   She reports setting up appointment for mammogram on Monday.  Diagnostic mammogram pending for PCP to sign.  Please advise.  Una Fraga RN

## 2017-03-24 NOTE — TELEPHONE ENCOUNTER
[facility] [phone].  Patient has appointment on Monday, March 27th at 7:45 a.m.  Per Dr. Ortiz, patient does not need office visit.  Madina Ochoa,

## 2017-03-24 NOTE — TELEPHONE ENCOUNTER
Does need diagnostic mammogram, then schedule follow up with pcp to assess shortly thereafter  Order signed-- in purple outbox  Schedule pt for visit Mon or Tues with her pcp  Julia Barrientos MD

## 2017-03-27 ENCOUNTER — TRANSFERRED RECORDS (OUTPATIENT)
Dept: HEALTH INFORMATION MANAGEMENT | Facility: CLINIC | Age: 52
End: 2017-03-27

## 2017-03-28 DIAGNOSIS — G43.909 MIGRAINE WITHOUT STATUS MIGRAINOSUS, NOT INTRACTABLE, UNSPECIFIED MIGRAINE TYPE: ICD-10-CM

## 2017-03-29 NOTE — TELEPHONE ENCOUNTER
Controlled Substance Refill Request for isometheptene-dichloralphenazone-acetaminophen (MIDRIN) -325 MG  Problem List Complete:  No     PROVIDER TO CONSIDER COMPLETION OF PROBLEM LIST AND OVERVIEW/CONTROLLED SUBSTANCE AGREEMENT    [place] reviewed:  reviewed- no concerns    [place]  [email or web address]    Clement Arechiga RN

## 2017-03-29 NOTE — TELEPHONE ENCOUNTER
Controlled Substance Refill Request for isometheptene-dichloralphenazone-acetaminophen (MIDRIN) -325 MG per capsule  Problem List Complete:  Yes    Last Written Prescription Date:  07/28/2016  Last Fill Quantity: 90,   # refills: 1    Last Office Visit with [facility] primary care provider: 03/17/2017    Clinic visit frequency required: ?     Future Office visit:   Next 5 appointments (look out 90 days)     Apr 07, 2017  7:15 AM CDT   Office Visit with Alix Ortiz MD   [facility] ([address] [address]   [phone]                  Controlled substance agreement on file: No.     Processing:  ?   checked in past 6 months?  No, route to JOZEF Hays MA

## 2017-03-30 NOTE — TELEPHONE ENCOUNTER
Silvanorin prescription faxed to [facility] pharmacy at [phone].  Madina Ochoa,

## 2017-04-07 ENCOUNTER — OFFICE VISIT (OUTPATIENT)
Dept: FAMILY MEDICINE | Facility: CLINIC | Age: 52
End: 2017-04-07
Payer: COMMERCIAL

## 2017-04-07 ENCOUNTER — TELEPHONE (OUTPATIENT)
Dept: FAMILY MEDICINE | Facility: CLINIC | Age: 52
End: 2017-04-07

## 2017-04-07 VITALS
BODY MASS INDEX: 25.03 KG/M2 | OXYGEN SATURATION: 98 % | HEIGHT: 62 IN | DIASTOLIC BLOOD PRESSURE: 76 MMHG | TEMPERATURE: 96 F | HEART RATE: 72 BPM | WEIGHT: 136 LBS | SYSTOLIC BLOOD PRESSURE: 122 MMHG

## 2017-04-07 DIAGNOSIS — F41.9 ANXIETY: ICD-10-CM

## 2017-04-07 DIAGNOSIS — I10 BENIGN ESSENTIAL HYPERTENSION: Primary | ICD-10-CM

## 2017-04-07 DIAGNOSIS — G43.C0 PERIODIC HEADACHE SYNDROME, NOT INTRACTABLE: ICD-10-CM

## 2017-04-07 DIAGNOSIS — G43.909 MIGRAINE WITHOUT STATUS MIGRAINOSUS, NOT INTRACTABLE, UNSPECIFIED MIGRAINE TYPE: ICD-10-CM

## 2017-04-07 LAB
ANION GAP SERPL CALCULATED.3IONS-SCNC: 5 MMOL/L (ref 3–14)
BUN SERPL-MCNC: 11 MG/DL (ref 7–30)
CALCIUM SERPL-MCNC: 9.7 MG/DL (ref 8.5–10.1)
CHLORIDE SERPL-SCNC: 100 MMOL/L (ref 94–109)
CO2 SERPL-SCNC: 34 MMOL/L (ref 20–32)
CREAT SERPL-MCNC: 0.63 MG/DL (ref 0.52–1.04)
GFR SERPL CREATININE-BSD FRML MDRD: ABNORMAL ML/MIN/1.7M2
GLUCOSE SERPL-MCNC: 94 MG/DL (ref 70–99)
POTASSIUM SERPL-SCNC: 3.9 MMOL/L (ref 3.4–5.3)
SODIUM SERPL-SCNC: 139 MMOL/L (ref 133–144)

## 2017-04-07 PROCEDURE — 36415 COLL VENOUS BLD VENIPUNCTURE: CPT | Performed by: INTERNAL MEDICINE

## 2017-04-07 PROCEDURE — 99214 OFFICE O/P EST MOD 30 MIN: CPT | Performed by: INTERNAL MEDICINE

## 2017-04-07 PROCEDURE — 80048 BASIC METABOLIC PNL TOTAL CA: CPT | Performed by: INTERNAL MEDICINE

## 2017-04-07 RX ORDER — CYCLOBENZAPRINE HCL 10 MG
5 TABLET ORAL
Qty: 90 TABLET | Refills: 1 | Status: SHIPPED | OUTPATIENT
Start: 2017-04-07 | End: 2020-01-16

## 2017-04-07 RX ORDER — LORAZEPAM 0.5 MG/1
TABLET ORAL
Qty: 30 TABLET | Refills: 1 | Status: SHIPPED | OUTPATIENT
Start: 2017-04-07 | End: 2017-08-09

## 2017-04-07 RX ORDER — HYDROCHLOROTHIAZIDE 25 MG/1
25 TABLET ORAL DAILY
Qty: 90 TABLET | Refills: 3 | Status: SHIPPED | OUTPATIENT
Start: 2017-04-07 | End: 2018-04-14

## 2017-04-07 ASSESSMENT — PAIN SCALES - GENERAL: PAINLEVEL: NO PAIN (0)

## 2017-04-07 NOTE — NURSING NOTE
"Chief Complaint   Patient presents with     Recheck Medication     Blood Pressure       Initial /76  Pulse 72  Temp 96  F (35.6  C) (Oral)  Ht 5' 1.5\" (1.562 m)  Wt 136 lb (61.7 kg)  LMP 01/09/2017 (Approximate)  SpO2 98%  BMI 25.28 kg/m2 Estimated body mass index is 25.28 kg/(m^2) as calculated from the following:    Height as of this encounter: 5' 1.5\" (1.562 m).    Weight as of this encounter: 136 lb (61.7 kg).  Medication Reconciliation: complete   Lindsay Ulrich MA    "

## 2017-04-07 NOTE — LETTER
[facility]    04/07/17    Patient: Vilma Barber  YOB: 1965  Medical Record Number: [id number]                                                                  Controlled Substance Agreement  I understand that my care provider has prescribed controlled substances (narcotics, tranquilizers, and/or stimulants) to help manage my condition(s).  I am taking this medicine to help me function or work.  I know that this is strong medicine.  It could have serious side effects and even cause a dependency on the drug.  If I stop these medicines suddenly, I could have severe withdrawal symptoms.    The risks, benefits, and side effects of these medication(s) were explained to me.  I agree that:  1. I will take part in other treatments as advised by my provider.  This may be psychiatry or counseling, physical therapy, behavioral therapy, group treatment, or a referral to a pain clinic.  I will reduce or stop my medicine when my provider tells me to do so.   2. I will take my medicines as prescribed.  I will not change the dose or schedule unless my provider tells me to.  There will be no refills if I  run out early.   I may be contacted at any time without warning and asked to complete a drug test or pill count.   3. I will keep all my appointments at the clinic.  If I miss appointments or fail to follow instructions, my provider may stop my medicine.  4. I will not ask other providers to prescribe controlled substances. And I will not accept controlled substances from other people. If I need another prescribed controlled substance for a new reason, I will notify my provider within one business day.  5. If I enroll in the [place] Medical Marijuana program, I will tell my provider.  I will also sign an agreement to share my medical records with my provider.  6. I will use one pharmacy to fill all of my controlled substance prescriptions.  If my prescription is mailed to my pharmacy, it may  take 5 to 7 days for my medicine to be ready.  7. I understand that my provider, clinic care team, and pharmacy can track controlled substance prescriptions from other providers through a central database (prescription monitoring program).  8. I will bring in my list of medications (or my medicine bottles) each time I come to the clinic.  REV- 04/2016                                                                                                                                            Page 1 of 2      [facility]    04/07/17    Patient: Vilma Barber  YOB: 1965  Medical Record Number: [id number]    9. Refills of controlled substances will be made only during office hours.  It is up to me to make sure that I do not run out of my medicines on weekends or holidays.    10. I am responsible for my prescriptions.  If the medicine is lost or stolen, it will not be replaced.   I also agree not to share these medicines with anyone.  11. I agree to not use ANY illegal or recreational drugs.  This includes marijuana, cocaine, bath salts or other drugs.  I agree not to use alcohol unless my provider says I may.  I agree to give urine samples whenever asked.  If I fail to give a urine sample, the provider may stop my medicine.     12. I will tell my nurse or provider right away if I become pregnant or have a new medical problem treated outside of [facility].  13. I understand that this medicine can affect my thinking and judgment.  It may be unsafe for me to drive, use machinery and do dangerous tasks.  I will not do any of these things until I know how the medicine affects me.  If my dose changes, I will wait to see how it affects me.  I will contact my provider if I have concerns about medicine side effects.  I understand that if I do not follow any of the conditions above, my prescriptions or treatment may be stopped.    I agree that my provider, clinic care team, and pharmacy may  work with any city, state or federal law enforcement agency that investigates the misuse, sale, or other diversion of my controlled medicine. I will allow my provider to discuss my care with or share a copy of this agreement with any other treating provider, pharmacy or emergency room where I receive care.  I agree to give up (waive) any right of privacy or confidentiality with respect to these authorizations.   I have read this agreement and have asked questions about anything I did not understand.   ___________________________________    ___________________________  Patient Signature                                                           Date and Time  ___________________________________     ____________________________  Witness                                                                            Date and Time  ___________________________________  Alix Ortiz MD  REV-  04/2016                                                                                                                                                                 Page 2 of 2

## 2017-04-07 NOTE — PATIENT INSTRUCTIONS
"- Try the smartphone josue \"Head Space\" to help with anxiety.     - Call your dentist to see if they make mouth guards to with grinding teeth.     - Since your blood pressure is down again, you can start taking migraine medications again.     -follow up in 6 months.      [facility]    If you have any questions regarding to your visit please contact your care team:     Team Pink:   Clinic Hours Telephone Number   Internal Medicine:  Dr. Alix Reilly NP       7am-7pm  Monday - Thursday   7am-5pm  Fridays  [phone]  (Appointment scheduling available 24/7)    Questions about your visit?  Team Line  [phone]   Urgent Care - [place] and [place] - 11am-9pm Monday-Friday Saturday-Sunday- 9am-5pm   [place] - 5pm-9pm Monday-Friday Saturday-Sunday- 9am-5pm  [phone] - [place]   [phone] - [place]       What options do I have for visits at the clinic other than the traditional office visit?  To expand how we care for you, many of our providers are utilizing electronic visits (e-visits) and telephone visits, when medically appropriate, for interactions with their patients rather than a visit in the clinic.   We also offer nurse visits for many medical concerns. Just like any other service, we will bill your insurance company for this type of visit based on time spent on the phone with your provider. Not all insurance companies cover these visits. Please check with your medical insurance if this type of visit is covered. You will be responsible for any charges that are not paid by your insurance.      E-visits via T-Networks:  generally incur a $35.00 fee.  Telephone visits:  Time spent on the phone: *charged based on time that is spent on the phone in increments of 10 minutes. Estimated cost:   5-10 mins $30.00   11-20 mins. $59.00   21-30 mins. $85.00   Use [employer]-Networks (secure email communication and access to your chart) to send your primary " care provider a message or make an appointment. Ask someone on your Team how to sign up for hiyalifehart.    For a Price Quote for your services, please call our Consumer Price Line at [phone].    As always, Thank you for trusting us with your health care needs!    Discharged by Cici JHA CMA ([facility])

## 2017-04-07 NOTE — LETTER
[address] [place] [address]    April 10, 2017    Vilma Barber  [address] [place] [address]  [place] [address]          Dear Vilma,    Normal kidney function. Normal electrolytes    Enclosed is a copy of your results.     Results for orders placed or performed in visit on 04/07/17   Basic metabolic panel   Result Value Ref Range    Sodium 139 133 - 144 mmol/L    Potassium 3.9 3.4 - 5.3 mmol/L    Chloride 100 94 - 109 mmol/L    Carbon Dioxide 34 (H) 20 - 32 mmol/L    Anion Gap 5 3 - 14 mmol/L    Glucose 94 70 - 99 mg/dL    Urea Nitrogen 11 7 - 30 mg/dL    Creatinine 0.63 0.52 - 1.04 mg/dL    GFR Estimate >90  Non  GFR Calc   >60 mL/min/1.7m2    GFR Estimate If Black >90   GFR Calc   >60 mL/min/1.7m2    Calcium 9.7 8.5 - 10.1 mg/dL       If you have any questions or concerns, please call myself or my nurse at [phone].      Sincerely,        Alix Ortiz MD/MARY

## 2017-04-07 NOTE — MR AVS SNAPSHOT
"              After Visit Summary   4/7/2017    Vilma Barber    MRN: [id number]           Patient Information     Date Of Birth          1965        Visit Information        Provider Department      4/7/2017 7:15 AM Alix Ortiz MD [facility]        Today's Diagnoses     Visit for screening mammogram    -  1    Benign essential hypertension        Migraine without status migrainosus, not intractable, unspecified migraine type        Periodic headache syndrome, not intractable        Anxiety          Care Instructions    - Try the smartphone josue \"Head Space\" to help with anxiety.     - Call your dentist to see if they make mouth guards to with grinding teeth.     - Since your blood pressure is down again, you can start taking migraine medications again.     -follow up in 6 months.      [facility]    If you have any questions regarding to your visit please contact your care team:     Team Pink:   Clinic Hours Telephone Number   Internal Medicine:  Dr. Alix Reilly, NP       7am-7pm  Monday - Thursday   7am-5pm  Fridays  [phone]  (Appointment scheduling available 24/7)    Questions about your visit?  Team Line  [phone]   Urgent Care - [place] and [place] - 11am-9pm Monday-Friday Saturday-Sunday- 9am-5pm   [place] - 5pm-9pm Monday-Friday Saturday-Sunday- 9am-5pm  [phone] - [place]   [phone] - [place]       What options do I have for visits at the clinic other than the traditional office visit?  To expand how we care for you, many of our providers are utilizing electronic visits (e-visits) and telephone visits, when medically appropriate, for interactions with their patients rather than a visit in the clinic.   We also offer nurse visits for many medical concerns. Just like any other service, we will bill your insurance company for this type of visit based on time spent on the phone with your " "provider. Not all insurance companies cover these visits. Please check with your medical insurance if this type of visit is covered. You will be responsible for any charges that are not paid by your insurance.      E-visits via COADEhart:  generally incur a $35.00 fee.  Telephone visits:  Time spent on the phone: *charged based on time that is spent on the phone in increments of 10 minutes. Estimated cost:   5-10 mins $30.00   11-20 mins. $59.00   21-30 mins. $85.00   Use [employer] (secure email communication and access to your chart) to send your primary care provider a message or make an appointment. Ask someone on your Team how to sign up for MeetMoi.    For a Price Quote for your services, please call our [employer] Line at [phone].    As always, Thank you for trusting us with your health care needs!    Discharged by Cici JHA CMA ([facility])          Follow-ups after your visit        Who to contact     If you have questions or need follow up information about today's clinic visit or your schedule please contact [facility] directly at [phone].  Normal or non-critical lab and imaging results will be communicated to you by COADEhart, letter or phone within 4 business days after the clinic has received the results. If you do not hear from us within 7 days, please contact the clinic through COADEhart or phone. If you have a critical or abnormal lab result, we will notify you by phone as soon as possible.  Submit refill requests through MeetMoi or call your pharmacy and they will forward the refill request to us. Please allow 3 business days for your refill to be completed.          Additional Information About Your Visit        MeetMoi Information     MeetMoi lets you send messages to your doctor, view your test results, renew your prescriptions, schedule appointments and more. To sign up, go to [email or web address] . Click on \"Log in\" on the left side of the screen, which will take you to the " "Welcome page. Then click on \"Sign up Now\" on the right side of the page.     You will be asked to enter the access code listed below, as well as some personal information. Please follow the directions to create your username and password.     Your access code is: [email or web address]  Expires: 2017  2:28 PM     Your access code will  in 90 days. If you need help or a new code, please call your [place] clinic or [phone].        Care EveryWhere ID     This is your Care EveryWhere ID. This could be used by other organizations to access your [place] medical records  [id number]        Your Vitals Were     Pulse Temperature Height Last Period Pulse Oximetry BMI (Body Mass Index)    72 96  F (35.6  C) (Oral) 5' 1.5\" (1.562 m) 2017 (Approximate) 98% 25.28 kg/m2       Blood Pressure from Last 3 Encounters:   17 122/76   17 138/84   17 138/90    Weight from Last 3 Encounters:   17 136 lb (61.7 kg)   17 134 lb (60.8 kg)   17 135 lb (61.2 kg)              We Performed the Following     Basic metabolic panel          Today's Medication Changes          These changes are accurate as of: 17  7:47 AM.  If you have any questions, ask your nurse or doctor.               These medicines have changed or have updated prescriptions.        Dose/Directions    LORazepam 0.5 MG tablet   Commonly known as:  ATIVAN   This may have changed:  See the new instructions.   Used for:  Anxiety   Changed by:  Alix Ortiz MD        TAKE 1/2 TO 1 TABLET BY MOUTH EVERY 8 HOURS AS NEEDED FOR ANXIETY   Quantity:  30 tablet   Refills:  1         Stop taking these medicines if you haven't already. Please contact your care team if you have questions.     tiZANidine 2 MG tablet   Commonly known as:  ZANAFLEX   Stopped by:  Alix Ortiz MD                Where to get your medicines      These medications were sent to [place] [address] [place] [facility] [place], [place] - [address]  [address] [place] [address]     Phone:  [phone]     cyclobenzaprine 10 MG tablet    hydrochlorothiazide 25 MG tablet         Some of these will need a paper prescription and others can be bought over the counter.  Ask your nurse if you have questions.     Bring a paper prescription for each of these medications     LORazepam 0.5 MG tablet                Primary Care Provider Office Phone # Fax #    RODGER Munguia [phone]       [address] [address]        Thank you!     Thank you for choosing [facility]  for your care. Our goal is always to provide you with excellent care. Hearing back from our patients is one way we can continue to improve our services. Please take a few minutes to complete the written survey that you may receive in the mail after your visit with us. Thank you!             Your Updated Medication List - Protect others around you: Learn how to safely use, store and throw away your medicines at www.disposemymeds.org.          This list is accurate as of: 4/7/17  7:47 AM.  Always use your most recent med list.                   Brand Name Dispense Instructions for use    aspirin 81 MG tablet      Take 81 mg by mouth daily       cyclobenzaprine 10 MG tablet    FLEXERIL    90 tablet    Take 0.5 tablets (5 mg) by mouth nightly as needed for muscle spasms       hydrochlorothiazide 25 MG tablet    HYDRODIURIL    90 tablet    Take 1 tablet (25 mg) by mouth daily       isometheptene-dichloralphenazone-acetaminophen -325 MG per capsule    MIDRIN    90 capsule    TAKE ONE CAPSULE BY MOUTH EVERY FOUR HOURS AS NEEDED FOR HEADACHES.       LORazepam 0.5 MG tablet    ATIVAN    30 tablet    TAKE 1/2 TO 1 TABLET BY MOUTH EVERY 8 HOURS AS NEEDED FOR ANXIETY       omeprazole 20 MG CR capsule    priLOSEC    90 capsule    Take 1 capsule (20 mg) by mouth daily

## 2017-04-07 NOTE — PROGRESS NOTES
INTERNAL MEDICINE   SUBJECTIVE:                                                    Vilma Barber is a 51 year old female who presents to clinic today for the following health issues:      Medication Followup     Taking Medication as prescribed: yes    Side Effects:  None    Medication Helping Symptoms:  yes   She has been taking her medication as instructed. She also started taking Melatonin to help with sleep, and it has been working. She continues to have headaches, but since her blood pressure is under better control, they are not as severe. She notes that her Midrin was  and the pharmacy would not let her refill it. She has been taking Ativan every other day to help reduce anxiety. She feels that this helps a lot to control her symptoms. She has been going to the gym which seems to be helping with her anxiety.     Moods: Sometimes she will feel down or depressed. She attributes a lot of this to her lifestyle. When she is occupied, she doesn't feel as down. When she isn't occupied she feels worse and will isolate herself. She is not interested in medications, indicating that this is something she needs to overcome mentally. On mornings when she wakes up with headaches, her moods will be worse.     Additional Notes: She did schedule her mammogram, but because she was having some chest pain below her left breast, she was told that she would need to have it done at [facility]. While there, she was only able to have the left breast done, and will need to go back for a full mammogram. She did meet with a cardiologist, and was told that her heart function is normal.       Problem list and histories reviewed & adjusted, as indicated.  Additional history: as documented    Patient Active Problem List   Diagnosis     GERD (gastroesophageal reflux disease)     CARDIOVASCULAR SCREENING; LDL GOAL LESS THAN 160     Iron deficiency     Migraines     Gilbert's disease     Breast mass     Benign essential  "hypertension     Back pain     Family history of breast cancer in first degree relative     Neck pain     Anxiety     HTN (hypertension)     History reviewed. No pertinent surgical history.    Social History   Substance Use Topics     Smoking status: Never Smoker     Smokeless tobacco: Never Used      Comment: smoke free household.     Alcohol use Yes      Comment: occ     Family History   Problem Relation Age of Onset     HEART DISEASE Brother      Asthma Son      Hypertension Mother      HEART DISEASE Mother      Glaucoma No family hx of      Macular Degeneration No family hx of      CANCER No family hx of      DIABETES No family hx of      Thyroid Disease No family hx of      Anesthesia Reaction No family hx of            Reviewed and updated as needed this visit by clinical staff  Tobacco  Allergies  Meds  Med Hx  Surg Hx  Fam Hx  Soc Hx      Reviewed and updated as needed this visit by Provider       ROS:  C: NEGATIVE for fever, chills, change in weight. POSITIVE for headaches.   E/M: NEGATIVE for ear, mouth and throat problems. POSITIVE for teeth grinding.   B: NEGATIVE for masses, tenderness or discharge  CV: NEGATIVE for chest pain, palpitations or peripheral edema  P: NEGATIVE for changes in mood or affect  All other systems reviewed and were negative.      This document serves as a record of the services and decisions personally performed and made by Alix Ortiz MD. It was created on his/her behalf by Monica Loaiza, a trained medical scribe. The creation of this document is based the provider's statements to the medical scribe.    Becky Loaiza 7:34 AM, April 7, 2017    OBJECTIVE:                                                    /76  Pulse 72  Temp 96  F (35.6  C) (Oral)  Ht 1.562 m (5' 1.5\")  Wt 61.7 kg (136 lb)  LMP 01/09/2017 (Approximate)  SpO2 98%  BMI 25.28 kg/m2  Body mass index is 25.28 kg/(m^2).  GENERAL: healthy, alert and no distress  RESP: lungs clear to " auscultation - no rales, rhonchi or wheezes  CV: regular rate and rhythm, normal S1 S2, no S3 or S4, no murmur, click or rub, no peripheral edema and peripheral pulses strong  PSYCH: mentation appears normal, affect normal/bright    Diagnostic Test Results:  No results found for this or any previous visit (from the past 24 hour(s)).  Results for orders placed or performed in visit on 03/17/17   Lipid panel reflex to direct LDL   Result Value Ref Range    Cholesterol 176 <200 mg/dL    Triglycerides 195 (H) <150 mg/dL    HDL Cholesterol 52 >49 mg/dL    LDL Cholesterol Calculated 85 <100 mg/dL    Non HDL Cholesterol 124 <130 mg/dL   CBC with platelets differential   Result Value Ref Range    WBC 4.8 4.0 - 11.0 10e9/L    RBC Count 4.12 3.8 - 5.2 10e12/L    Hemoglobin 12.5 11.7 - 15.7 g/dL    Hematocrit 36.0 35.0 - 47.0 %    MCV 87 78 - 100 fl    MCH 30.3 26.5 - 33.0 pg    MCHC 34.7 31.5 - 36.5 g/dL    RDW 12.2 10.0 - 15.0 %    Platelet Count 217 150 - 450 10e9/L    Diff Method Automated Method     % Neutrophils 50.8 %    % Lymphocytes 39.1 %    % Monocytes 8.9 %    % Eosinophils 1.0 %    % Basophils 0.2 %    Absolute Neutrophil 2.4 1.6 - 8.3 10e9/L    Absolute Lymphocytes 1.9 0.8 - 5.3 10e9/L    Absolute Monocytes 0.4 0.0 - 1.3 10e9/L    Absolute Eosinophils 0.1 0.0 - 0.7 10e9/L    Absolute Basophils 0.0 0.0 - 0.2 10e9/L   Comprehensive metabolic panel   Result Value Ref Range    Sodium 141 133 - 144 mmol/L    Potassium 4.5 3.4 - 5.3 mmol/L    Chloride 101 94 - 109 mmol/L    Carbon Dioxide 32 20 - 32 mmol/L    Anion Gap 8 3 - 14 mmol/L    Glucose 93 70 - 99 mg/dL    Urea Nitrogen 13 7 - 30 mg/dL    Creatinine 0.60 0.52 - 1.04 mg/dL    GFR Estimate >90  Non  GFR Calc   >60 mL/min/1.7m2    GFR Estimate If Black >90   GFR Calc   >60 mL/min/1.7m2    Calcium 9.6 8.5 - 10.1 mg/dL    Bilirubin Total 1.8 (H) 0.2 - 1.3 mg/dL    Albumin 4.1 3.4 - 5.0 g/dL    Protein Total 7.4 6.8 - 8.8 g/dL     "Alkaline Phosphatase 46 40 - 150 U/L    ALT 25 0 - 50 U/L    AST 12 0 - 45 U/L        ASSESSMENT/PLAN:                                                    I spent 14 minutes of time with the patient and >50% of it was in education and counseling regarding migraine prophylaxis and hypertension management.     1. Visit for screening mammogram   Patient has already scheduled.     2. Benign essential hypertension   The current medical regimen is effective; continue present plan and medications    - Basic metabolic panel  - hydrochlorothiazide (HYDRODIURIL) 25 MG tablet; Take 1 tablet (25 mg) by mouth daily  Dispense: 90 tablet; Refill: 3    3. Migraine without status migrainosus, not intractable, unspecified migraine type   Since BP is under control, patient can resume migraine medications. I prefer that she work on sleep, exercise and stress reduction.  Patient agrees     4. Periodic headache syndrome, not intractable   The current medical regimen is effective; continue present plan and medications    - cyclobenzaprine (FLEXERIL) 10 MG tablet; Take 0.5 tablets (5 mg) by mouth nightly as needed for muscle spasms  Dispense: 90 tablet; Refill: 1    5. Anxiety  Declines daily meds, although this is what she needed.  Controlled substance agreement reviewed with patient and signed.  The current medical regimen is effective; continue present plan and medications    - LORazepam (ATIVAN) 0.5 MG tablet; TAKE 1/2 TO 1 TABLET BY MOUTH EVERY 8 HOURS AS NEEDED FOR ANXIETY  Dispense: 30 tablet; Refill: 1      Patient Instructions   - Try the smartphone josue \"Head Space\" to help with anxiety.   - Call your dentist to see if they make mouth guards to with grinding teeth.   - Since your blood pressure is down again, you can start taking migraine medications again.   - Follow up in 6 months.      The information in this document, created by the medical scribe for me, accurately reflects the services I personally performed and the decisions " made by me. I have reviewed and approved this document for accuracy prior to leaving the patient care area.  Alix Ortiz MD  7:38 AM, 04/07/17    Alix Ortiz MD  [facility]    Start: 7:33 AM   End: 7:47 AM

## 2017-04-08 ASSESSMENT — PATIENT HEALTH QUESTIONNAIRE - PHQ9: SUM OF ALL RESPONSES TO PHQ QUESTIONS 1-9: 4

## 2017-04-25 NOTE — PROGRESS NOTES
SUBJECTIVE:                                                    Vilma Barber is a 51 year old female who presents to clinic today for the following health issues:    Musculoskeletal problem/pain      Duration: 1 month    Description  Location: left side under rib     Intensity:  mild    Accompanying signs and symptoms: numbness    History  Previous similar problem: no   Previous evaluation:  Dr. Ortiz on 03/17/2017    Precipitating or alleviating factors:  Trauma or overuse: no   Aggravating factors include: none    Therapies tried and outcome: nothing     Right lower costal pain/discomfort been going on for a while, this Monday felt a pulsating sensation and worried her even more.  Associated with a sense of numbness in the area; lower costal border.  When uses coffee has the pulsating sensation.  Bowel movements are normal  She is concerned because all these reports flying around about diseases especially cancer or something bad.  She was also told that her carbon dioxide in the kidney was high and has been googling to find out what that means but did not get much.    US ABDOMEN COMPLETE 3/16/2016 8:05 AM   FINDINGS: The liver is normal in size and texture without focal mass.  There is no intra or extrahepatic biliary dilatation. The common  hepatic duct measures 0.6 cm. The gallbladder is normal appearance  without gallstones. The pancreas head and body appear normal. The tail  is obscured by bowel gas. The spleen is normal size and appearance  measuring 9.1 cm. The right kidney measures 9.2 cm and the left kidney  measures 10.6 cm. The kidneys are normal in appearance. The proximal  abdominal aorta and IVC appear normal.    Problem list and histories reviewed & adjusted, as indicated.  Additional history: as documented    Patient Active Problem List   Diagnosis     GERD (gastroesophageal reflux disease)     CARDIOVASCULAR SCREENING; LDL GOAL LESS THAN 160     Iron deficiency     Migraines     Gilbert's  "disease     Breast mass     Benign essential hypertension     Back pain     Family history of breast cancer in first degree relative     Neck pain     Anxiety     HTN (hypertension)     History reviewed. No pertinent surgical history.    Social History   Substance Use Topics     Smoking status: Never Smoker     Smokeless tobacco: Never Used      Comment: smoke free household.     Alcohol use Yes      Comment: occ     Family History   Problem Relation Age of Onset     HEART DISEASE Brother      Asthma Son      Hypertension Mother      HEART DISEASE Mother      Glaucoma No family hx of      Macular Degeneration No family hx of      CANCER No family hx of      DIABETES No family hx of      Thyroid Disease No family hx of      Anesthesia Reaction No family hx of            Reviewed and updated as needed this visit by clinical staff       Reviewed and updated as needed this visit by Provider         ROS:  Constitutional, HEENT, cardiovascular, pulmonary, gi and gu systems are negative, except as otherwise noted.    OBJECTIVE:                                                    /80  Pulse 64  Temp 97.1  F (36.2  C) (Oral)  Ht 5' 1.5\" (1.562 m)  Wt 135 lb 9.6 oz (61.5 kg)  SpO2 100%  BMI 25.21 kg/m2  Body mass index is 25.21 kg/(m^2).  GENERAL: healthy, alert and no distress  NECK: no adenopathy and thyroid normal to palpation  RESP: lungs clear to auscultation - no rales, rhonchi or wheezes  CV: regular rate and rhythm, no murmur, click or rub, no peripheral edema   ABDOMEN: soft, tenderness in epigastrium with deep palpation, aortic pulsations can be felt, no hepatomegaly, no masses and bowel sounds normal  MS: no gross musculoskeletal defects noted, no edema    Diagnostic Test Results:  none      ASSESSMENT/PLAN:                                                    (R10.11) RUQ abdominal pain  (primary encounter diagnosis)  Comment: Differential: GB disease, Hepatitis, PUD. Will check H Pylorii at this " time.  Plan: H Pylori antigen, stool    (R10.13) Abdominal pain, epigastric  Comment: Taking Prilosec on and off. Not taken in past week. Discussed checking for H pylorii  Plan: H Pylori antigen, stool    (F41.9) Anxiety  Comment: Seems overwhelmed by these sensations and discussed fact that during evaluation we try to rule out life threatening conditions and work down. Reviewed US from last year which showed everything to be normal then and also discussed fact that exam is unremarkable. The pulsations might be transmissions from the abdominal aorta.  Plan: Reassurance    (E80.4) Gilbert's disease  Comment: Noted from 6/2013. Last bili check on 3/17/17 was elevated 1.8 (0.2-1.3). No symptoms  Plan: Continue to follow    Sushil Braden MD  [facility]

## 2017-04-26 ENCOUNTER — OFFICE VISIT (OUTPATIENT)
Dept: FAMILY MEDICINE | Facility: CLINIC | Age: 52
End: 2017-04-26
Payer: COMMERCIAL

## 2017-04-26 VITALS
DIASTOLIC BLOOD PRESSURE: 80 MMHG | WEIGHT: 135.6 LBS | SYSTOLIC BLOOD PRESSURE: 116 MMHG | OXYGEN SATURATION: 100 % | TEMPERATURE: 97.1 F | BODY MASS INDEX: 24.95 KG/M2 | HEIGHT: 62 IN | HEART RATE: 64 BPM

## 2017-04-26 DIAGNOSIS — E80.4 GILBERT'S DISEASE: ICD-10-CM

## 2017-04-26 DIAGNOSIS — R10.13 ABDOMINAL PAIN, EPIGASTRIC: ICD-10-CM

## 2017-04-26 DIAGNOSIS — F41.9 ANXIETY: ICD-10-CM

## 2017-04-26 DIAGNOSIS — R10.11 RUQ ABDOMINAL PAIN: Primary | ICD-10-CM

## 2017-04-26 PROCEDURE — 99214 OFFICE O/P EST MOD 30 MIN: CPT | Performed by: FAMILY MEDICINE

## 2017-04-26 NOTE — MR AVS SNAPSHOT
After Visit Summary   4/26/2017    Vilma Barber    MRN: [id number]           Patient Information     Date Of Birth          1965        Visit Information        Provider Department      4/26/2017 7:00 AM Sushil Braden MD [facility]        Today's Diagnoses     RUQ abdominal pain    -  1    Abdominal pain, epigastric          Care Instructions    [facility]    If you have any questions regarding to your visit please contact your care team:       Team Purple:   Clinic Hours Telephone Number   KYLIE Barron Dr., Dr.   7am-7pm  Monday - Thursday   7am-5pm  Fridays  [phone]  (Appointment scheduling available 24/7)    Questions about your Visit?   Team Line:  [phone]   Urgent Care - [place] and [place] - 11am-9pm Monday-Friday Saturday-Sunday- 9am-5pm   [place] - 5pm-9pm Monday-Friday Saturday-Sunday- 9am-5pm  [phone] - [facility]  [phone] - [place]       What options do I have for visits at the clinic other than the traditional office visit?  To expand how we care for you, many of our providers are utilizing electronic visits (e-visits) and telephone visits, when medically appropriate, for interactions with their patients rather than a visit in the clinic.   We also offer nurse visits for many medical concerns. Just like any other service, we will bill your insurance company for this type of visit based on time spent on the phone with your provider. Not all insurance companies cover these visits. Please check with your medical insurance if this type of visit is covered. You will be responsible for any charges that are not paid by your insurance.      E-visits via Express Fit:  generally incur a $35.00 fee.  Telephone visits:  Time spent on the phone: *charged based on time that is spent on the phone in increments of 10 minutes. Estimated cost:   5-10 mins $30.00    11-20 mins. $59.00   21-30 mins. $85.00     Use Innolight (secure email communication and access to your chart) to send your primary care provider a message or make an appointment. Ask someone on your Team how to sign up for Innolight.  For a Price Quote for your services, please call our Consumer Price Line at [phone].  As always, Thank you for trusting us with your health care needs!    Sidra Alvarado MA          Follow-ups after your visit        Your next 10 appointments already scheduled     Oct 06, 2017  7:30 AM CDT   Office Visit with Alix Ortiz MD   [facility]    [address] [place] [id number] [phone]           Bring a current list of meds and any records pertaining to this visit.  For Physicals, please bring immunization records and any forms needing to be filled out.  Please arrive 10 minutes early to complete paperwork.              Future tests that were ordered for you today     Open Future Orders        Priority Expected Expires Ordered    H Pylori antigen, stool Routine  5/26/2017 4/26/2017            Who to contact     If you have questions or need follow up information about today's clinic visit or your schedule please contact [facility] directly at [phone].  Normal or non-critical lab and imaging results will be communicated to you by Qualtricshart, letter or phone within 4 business days after the clinic has received the results. If you do not hear from us within 7 days, please contact the clinic through Qualtricshart or phone. If you have a critical or abnormal lab result, we will notify you by phone as soon as possible.  Submit refill requests through Innolight or call your pharmacy and they will forward the refill request to us. Please allow 3 business days for your refill to be completed.          Additional Information About Your Visit        Innolight Information     Innolight lets you send messages to your doctor, view  "your test results, renew your prescriptions, schedule appointments and more. To sign up, go to [email or web address] . Click on \"Log in\" on the left side of the screen, which will take you to the Welcome page. Then click on \"Sign up Now\" on the right side of the page.     You will be asked to enter the access code listed below, as well as some personal information. Please follow the directions to create your username and password.     Your access code is: [email or web address]  Expires: 2017  2:28 PM     Your access code will  in 90 days. If you need help or a new code, please call your [place] clinic or [phone].        Care EveryWhere ID     This is your Care EveryWhere ID. This could be used by other organizations to access your [place] medical records  [id number]        Your Vitals Were     Pulse Temperature Height Pulse Oximetry BMI (Body Mass Index)       64 97.1  F (36.2  C) (Oral) 5' 1.5\" (1.562 m) 100% 25.21 kg/m2        Blood Pressure from Last 3 Encounters:   17 116/80   17 122/76   17 138/84    Weight from Last 3 Encounters:   17 135 lb 9.6 oz (61.5 kg)   17 136 lb (61.7 kg)   17 134 lb (60.8 kg)              We Performed the Following     DEPRESSION ACTION PLAN (DAP)        Primary Care Provider Office Phone # Fax #    Angela RODGER Hernandez [facility] [phone]       [facility] [address] [place] [address]        Thank you!     Thank you for choosing [facility]  for your care. Our goal is always to provide you with excellent care. Hearing back from our patients is one way we can continue to improve our services. Please take a few minutes to complete the written survey that you may receive in the mail after your visit with us. Thank you!             Your Updated Medication List - Protect others around you: Learn how to safely use, store and throw away your medicines at www.disposemymeds.org.        "   This list is accurate as of: 4/26/17  7:28 AM.  Always use your most recent med list.                   Brand Name Dispense Instructions for use    aspirin 81 MG tablet      Take 81 mg by mouth daily       cyclobenzaprine 10 MG tablet    FLEXERIL    90 tablet    Take 0.5 tablets (5 mg) by mouth nightly as needed for muscle spasms       hydrochlorothiazide 25 MG tablet    HYDRODIURIL    90 tablet    Take 1 tablet (25 mg) by mouth daily       LORazepam 0.5 MG tablet    ATIVAN    30 tablet    TAKE 1/2 TO 1 TABLET BY MOUTH EVERY 8 HOURS AS NEEDED FOR ANXIETY       omeprazole 20 MG CR capsule    priLOSEC    90 capsule    Take 1 capsule (20 mg) by mouth daily

## 2017-04-26 NOTE — PATIENT INSTRUCTIONS
[facility]    If you have any questions regarding to your visit please contact your care team:       Team Purple:   Clinic Hours Telephone Number   KYLIE Barron Dr., Dr.   7am-7pm  Monday - Thursday   7am-5pm  Fridays  [phone]  (Appointment scheduling available 24/7)    Questions about your Visit?   Team Line:  [phone]   Urgent Care - [place] and [facility] - 11am-9pm Monday-Friday Saturday-Sunday- 9am-5pm   [place] - 5pm-9pm Monday-Friday Saturday-Sunday- 9am-5pm  [phone] - [facility]  [phone] - [place]       What options do I have for visits at the clinic other than the traditional office visit?  To expand how we care for you, many of our providers are utilizing electronic visits (e-visits) and telephone visits, when medically appropriate, for interactions with their patients rather than a visit in the clinic.   We also offer nurse visits for many medical concerns. Just like any other service, we will bill your insurance company for this type of visit based on time spent on the phone with your provider. Not all insurance companies cover these visits. Please check with your medical insurance if this type of visit is covered. You will be responsible for any charges that are not paid by your insurance.      E-visits via Autrement (HotelHotel)t:  generally incur a $35.00 fee.  Telephone visits:  Time spent on the phone: *charged based on time that is spent on the phone in increments of 10 minutes. Estimated cost:   5-10 mins $30.00   11-20 mins. $59.00   21-30 mins. $85.00     Use Autrement (HotelHotel)t (secure email communication and access to your chart) to send your primary care provider a message or make an appointment. Ask someone on your Team how to sign up for Vormetric.  For a Price Quote for your services, please call our Consumer Price Line at [phone].  As always, Thank you for trusting us with your health care needs!    Sidra  Graves, MA

## 2017-04-26 NOTE — NURSING NOTE
"Chief Complaint   Patient presents with     Pain     left side under rib pain x  1 week and numbness x 1 month       Initial /80  Pulse 64  Temp 97.1  F (36.2  C) (Oral)  Ht 5' 1.5\" (1.562 m)  Wt 135 lb 9.6 oz (61.5 kg)  SpO2 100%  BMI 25.21 kg/m2 Estimated body mass index is 25.21 kg/(m^2) as calculated from the following:    Height as of this encounter: 5' 1.5\" (1.562 m).    Weight as of this encounter: 135 lb 9.6 oz (61.5 kg).  Medication Reconciliation: complete     An MILAGRO Hays    "

## 2017-05-04 ENCOUNTER — TRANSFERRED RECORDS (OUTPATIENT)
Dept: HEALTH INFORMATION MANAGEMENT | Facility: CLINIC | Age: 52
End: 2017-05-04

## 2017-05-09 PROCEDURE — 87338 HPYLORI STOOL AG IA: CPT | Performed by: FAMILY MEDICINE

## 2017-05-10 DIAGNOSIS — R10.11 RUQ ABDOMINAL PAIN: ICD-10-CM

## 2017-05-10 DIAGNOSIS — R10.13 ABDOMINAL PAIN, EPIGASTRIC: ICD-10-CM

## 2017-05-11 LAB
H PYLORI AG STL QL IA: NORMAL
MICRO REPORT STATUS: NORMAL
SPECIMEN SOURCE: NORMAL

## 2017-07-05 ENCOUNTER — APPOINTMENT (OUTPATIENT)
Dept: OPTOMETRY | Facility: CLINIC | Age: 52
End: 2017-07-05
Payer: COMMERCIAL

## 2017-07-05 ENCOUNTER — OFFICE VISIT (OUTPATIENT)
Dept: OPTOMETRY | Facility: CLINIC | Age: 52
End: 2017-07-05
Payer: COMMERCIAL

## 2017-07-05 DIAGNOSIS — H52.03 HYPEROPIA OF BOTH EYES WITH ASTIGMATISM AND PRESBYOPIA: ICD-10-CM

## 2017-07-05 DIAGNOSIS — H52.4 HYPEROPIA OF BOTH EYES WITH ASTIGMATISM AND PRESBYOPIA: ICD-10-CM

## 2017-07-05 DIAGNOSIS — Z01.00 EXAMINATION OF EYES AND VISION: Primary | ICD-10-CM

## 2017-07-05 DIAGNOSIS — H52.203 HYPEROPIA OF BOTH EYES WITH ASTIGMATISM AND PRESBYOPIA: ICD-10-CM

## 2017-07-05 PROCEDURE — 92015 DETERMINE REFRACTIVE STATE: CPT | Performed by: OPTOMETRIST

## 2017-07-05 PROCEDURE — 92004 COMPRE OPH EXAM NEW PT 1/>: CPT | Performed by: OPTOMETRIST

## 2017-07-05 PROCEDURE — 92341 FIT SPECTACLES BIFOCAL: CPT | Performed by: OPTOMETRIST

## 2017-07-05 ASSESSMENT — REFRACTION_WEARINGRX
SPECS_TYPE: READING
OD_AXIS: 015
OS_CYLINDER: +1.00
OD_SPHERE: -0.75
SPECS_TYPE: LAST RX
OS_SPHERE: +2.50
OD_SPHERE: +2.50
OS_AXIS: 015
OD_ADD: +1.50
OD_CYLINDER: +0.75
OS_ADD: +1.50
OS_SPHERE: +0.25

## 2017-07-05 ASSESSMENT — SLIT LAMP EXAM - LIDS
COMMENTS: NORMAL
COMMENTS: NORMAL

## 2017-07-05 ASSESSMENT — REFRACTION_MANIFEST
OS_AXIS: 020
OD_CYLINDER: +1.25
OD_AXIS: 015
OS_CYLINDER: +0.50
OD_SPHERE: PLANO
OS_SPHERE: +1.50
OS_ADD: +2.25
OD_ADD: +2.25

## 2017-07-05 ASSESSMENT — VISUAL ACUITY
OS_SC: 20/100
OD_CC: 20/30
OS_SC: 20/40
OD_SC: 20/40
OD_SC: 20/100
OS_CC: 20/50
METHOD: SNELLEN - LINEAR
OS_SC+: -2

## 2017-07-05 ASSESSMENT — CUP TO DISC RATIO
OD_RATIO: 0.1
OS_RATIO: 0.1

## 2017-07-05 ASSESSMENT — EXTERNAL EXAM - RIGHT EYE: OD_EXAM: NORMAL

## 2017-07-05 ASSESSMENT — TONOMETRY
IOP_METHOD: APPLANATION
OS_IOP_MMHG: 12
OD_IOP_MMHG: 12

## 2017-07-05 ASSESSMENT — CONF VISUAL FIELD
OS_NORMAL: 1
OD_NORMAL: 1

## 2017-07-05 ASSESSMENT — EXTERNAL EXAM - LEFT EYE: OS_EXAM: NORMAL

## 2017-07-05 NOTE — PROGRESS NOTES
Chief Complaint   Patient presents with     COMPREHENSIVE EYE EXAM         Last Eye Exam: 2012  Dilated Previously: Yes    What are you currently using to see?  Her glasses are broken - and has been using a friends glasses.       Distance Vision Acuity: Noticed gradual change in both eyes - Pt is finding her eyes are very tired and has a hard time seeing in the distance    Near Vision Acuity: Not satisfied     Eye Comfort: Corners of eyes sometimes feel like something in her eyes.    Do you use eye drops? : No  Occupation or Hobbies: Works for a [employer]            Medical, surgical and family histories reviewed and updated 7/5/2017.       OBJECTIVE: See Ophthalmology exam    ASSESSMENT:    ICD-10-CM    1. Examination of eyes and vision Z01.00 EYE EXAM (SIMPLE-NONBILLABLE)     REFRACTION   2. Hyperopia of both eyes with astigmatism and presbyopia H52.03 EYE EXAM (SIMPLE-NONBILLABLE)    H52.203 REFRACTION    H52.4       PLAN:   A final glasses prescription was given.  Allow time for adaptation.  The glasses may cause dizziness and affect depth perception for awhile.  Return to clinic 1 year for Comprehensive Vision Exam      Joy Nelson O.D  [address]  [place]  [address]    [phone]

## 2017-07-05 NOTE — MR AVS SNAPSHOT
After Visit Summary   7/5/2017    Vilma Barber    MRN: [id number]           Patient Information     Date Of Birth          1965        Visit Information        Provider Department      7/5/2017 1:00 PM Joy Nelson OD [facility]        Today's Diagnoses     Examination of eyes and vision    -  1    Hyperopia of both eyes with astigmatism and presbyopia          Care Instructions        A final glasses prescription was given.  Allow time for adaptation.  The glasses may cause dizziness and affect depth perception for awhile.  Return to clinic 1 year for Comprehensive Vision Exam      Joy Nelson O.D  [facility]  [address] [place] [address]. [place]  [place], [place]  [address] [phone]                  Follow-ups after your visit        Follow-up notes from your care team     Return in about 1 year (around 7/5/2018) for Eye Exam.      Your next 10 appointments already scheduled     Oct 06, 2017  7:30 AM CDT   Office Visit with Alix Ortiz MD   [facility] ([facility])    [address] [place] [id number] [phone]           Bring a current list of meds and any records pertaining to this visit.  For Physicals, please bring immunization records and any forms needing to be filled out.  Please arrive 10 minutes early to complete paperwork.              Who to contact     If you have questions or need follow up information about today's clinic visit or your schedule please contact [facility] directly at [phone].  Normal or non-critical lab and imaging results will be communicated to you by MyChart, letter or phone within 4 business days after the clinic has received the results. If you do not hear from us within 7 days, please contact the clinic through Appotahart or phone. If you have a critical or abnormal lab result, we will notify you by phone as soon as possible.  Submit refill requests through Cinemacraft  "or call your pharmacy and they will forward the refill request to us. Please allow 3 business days for your refill to be completed.          Additional Information About Your Visit        MyChart Information     [employer] lets you send messages to your doctor, view your test results, renew your prescriptions, schedule appointments and more. To sign up, go to [email or web address] (formerly [email or web address])t . Click on \"Log in\" on the left side of the screen, which will take you to the Welcome page. Then click on \"Sign up Now\" on the right side of the page.     You will be asked to enter the access code listed below, as well as some personal information. Please follow the directions to create your username and password.     Your access code is: [email or web address]  Expires: 10/3/2017  2:01 PM     Your access code will  in 90 days. If you need help or a new code, please call your [place] clinic or [phone].        Care EveryWhere ID     This is your Care EveryWhere ID. This could be used by other organizations to access your [place] medical records  [id number]         Blood Pressure from Last 3 Encounters:   17 116/80   17 122/76   17 138/84    Weight from Last 3 Encounters:   17 61.5 kg (135 lb 9.6 oz)   17 61.7 kg (136 lb)   17 60.8 kg (134 lb)              We Performed the Following     EYE EXAM (SIMPLE-NONBILLABLE)     REFRACTION        Primary Care Provider Office Phone # Fax #    Angela RODGER Hernandez [facility] [phone]       [address] [address]        Equal Access to Services     TIAN ROJAS : Hadii asael butler Sotim, waaxda luqadaha, qaybta kaalmada adeegyada, sam beach. So [facility] [phone].    ATENCIÓN: Si habla español, tiene a gr disposición servicios gratuitos de asistencia lingüística. Llame al [phone].    We comply with applicable federal civil rights laws and [place] laws. We " do not discriminate on the basis of race, color, national origin, age, disability sex, sexual orientation or gender identity.            Thank you!     Thank you for choosing [facility] [place]  for your care. Our goal is always to provide you with excellent care. Hearing back from our patients is one way we can continue to improve our services. Please take a few minutes to complete the written survey that you may receive in the mail after your visit with us. Thank you!             Your Updated Medication List - Protect others around you: Learn how to safely use, store and throw away your medicines at www.disposemymeds.org.          This list is accurate as of: 7/5/17  2:01 PM.  Always use your most recent med list.                   Brand Name Dispense Instructions for use Diagnosis    aspirin 81 MG tablet      Take 81 mg by mouth daily        cyclobenzaprine 10 MG tablet    FLEXERIL    90 tablet    Take 0.5 tablets (5 mg) by mouth nightly as needed for muscle spasms    Periodic headache syndrome, not intractable       hydrochlorothiazide 25 MG tablet    HYDRODIURIL    90 tablet    Take 1 tablet (25 mg) by mouth daily    Benign essential hypertension       LORazepam 0.5 MG tablet    ATIVAN    30 tablet    TAKE 1/2 TO 1 TABLET BY MOUTH EVERY 8 HOURS AS NEEDED FOR ANXIETY    Anxiety       omeprazole 20 MG CR capsule    priLOSEC    90 capsule    Take 1 capsule (20 mg) by mouth daily    Gastroesophageal reflux disease without esophagitis

## 2017-07-05 NOTE — PATIENT INSTRUCTIONS
A final glasses prescription was given.  Allow time for adaptation.  The glasses may cause dizziness and affect depth perception for awhile.  Return to clinic 1 year for Comprehensive Vision Exam      Joy Nelson O.D  [address]  [place]  [address]    [phone]

## 2017-08-09 DIAGNOSIS — F41.9 ANXIETY: ICD-10-CM

## 2017-08-10 RX ORDER — LORAZEPAM 0.5 MG/1
TABLET ORAL
Qty: 30 TABLET | Refills: 0 | Status: SHIPPED | OUTPATIENT
Start: 2017-08-10 | End: 2020-12-14

## 2017-08-10 NOTE — TELEPHONE ENCOUNTER
Routing refill request to provider for review/approval because:  Drug not on the [facility] refill protocol   Nothing comes up on [place] [facility]

## 2018-05-18 ENCOUNTER — TRANSFERRED RECORDS (OUTPATIENT)
Dept: HEALTH INFORMATION MANAGEMENT | Facility: CLINIC | Age: 53
End: 2018-05-18

## 2018-05-25 NOTE — PROGRESS NOTES
SUBJECTIVE:   Vilma Barber is a 52 year old female who presents to clinic today for the following health issues:      Hypertension Follow-up      Outpatient blood pressures sometimes 5/3/18 it was 151/98 felt dizzy and nausea    Low Salt Diet: not adding salt      Amount of exercise or physical activity:3-4 days/week for an average of 45-60 minutes    Problems taking medications regularly: No    Medication side effects: none    Diet: low salt    Patient notes palpitations, substernal chest pressure radiating up into neck for several seconds intermittently for the past 2-3 weeks.  She notices symptoms with activity and at rest.  She feels symptoms are improving.  She can exercise without symptoms.  Patient notes some reflux symptoms.  She notes dry mouth.     Patient notes intermittent migraines.  She was previously on midrin, but this was not covered by her insurance.  She would like to see if it is covered now.        Problem list and histories reviewed & adjusted, as indicated.  Additional history: as documented    Patient Active Problem List   Diagnosis     GERD (gastroesophageal reflux disease)     CARDIOVASCULAR SCREENING; LDL GOAL LESS THAN 160     Iron deficiency     Migraines     Gilbert's disease     Breast mass     Benign essential hypertension     Back pain     Family history of breast cancer in first degree relative     Neck pain     Anxiety     HTN (hypertension)     History reviewed. No pertinent surgical history.    Social History   Substance Use Topics     Smoking status: Never Smoker     Smokeless tobacco: Never Used      Comment: smoke free household.     Alcohol use Yes      Comment: occ     Family History   Problem Relation Age of Onset     HEART DISEASE Brother      Asthma Son      Hypertension Mother      HEART DISEASE Mother      Glaucoma No family hx of      Macular Degeneration No family hx of      CANCER No family hx of      DIABETES No family hx of      Thyroid Disease No family  "hx of      Anesthesia Reaction No family hx of          Current Outpatient Prescriptions   Medication Sig Dispense Refill     aspirin 81 MG tablet Take 81 mg by mouth daily       cyclobenzaprine (FLEXERIL) 10 MG tablet Take 0.5 tablets (5 mg) by mouth nightly as needed for muscle spasms 90 tablet 1     hydrochlorothiazide (HYDRODIURIL) 25 MG tablet TAKE 1 TABLET (25 MG) BY MOUTH DAILY 90 tablet 3     isometheptene-dichloralphenazone-acetaminophen (MIDRIN) -325 MG per capsule Take 1 capsule by mouth every 4 hours as needed for headaches 30 capsule 3     LORazepam (ATIVAN) 0.5 MG tablet TAKE 1/2 TO 1 TABLET BY MOUTH EVERY 8 HOURS AS NEEDED FOR ANXIETY 30 tablet 0     ranitidine (ZANTAC) 150 MG tablet Take 150 mg by mouth At Bedtime       ranitidine (ZANTAC) 150 MG tablet Take 1 tablet (150 mg) by mouth 2 times daily 60 tablet 3     [DISCONTINUED] hydrochlorothiazide (HYDRODIURIL) 25 MG tablet TAKE 1 TABLET (25 MG) BY MOUTH DAILY NEED TO SCHEDULE APPT FOR FURTHER REFILLS 30 tablet 0     No Known Allergies  BP Readings from Last 3 Encounters:   05/31/18 110/70   04/26/17 116/80   04/07/17 122/76    Wt Readings from Last 3 Encounters:   05/31/18 143 lb 12.8 oz (65.2 kg)   04/26/17 135 lb 9.6 oz (61.5 kg)   04/07/17 136 lb (61.7 kg)                  Labs reviewed in EPIC    Reviewed and updated as needed this visit by clinical staff       Reviewed and updated as needed this visit by Provider         ROS:  Constitutional, HEENT, cardiovascular, pulmonary, gi and gu systems are negative, except as otherwise noted.    OBJECTIVE:     /70  Pulse 59  Temp 97.9  F (36.6  C) (Oral)  Resp 12  Ht 5' 1.5\" (1.562 m)  Wt 143 lb 12.8 oz (65.2 kg)  SpO2 100%  BMI 26.73 kg/m2  Body mass index is 26.73 kg/(m^2).  GENERAL: healthy, alert and no distress  RESP: lungs clear to auscultation - no rales, rhonchi or wheezes  CV: regular rate and rhythm, normal S1 S2, no S3 or S4, no murmur, click or rub, no peripheral edema " and peripheral pulses strong  ABDOMEN: soft, nontender, no hepatosplenomegaly, no masses and bowel sounds normal  MS: no gross musculoskeletal defects noted, no edema    Diagnostic Test Results:  Pending      ASSESSMENT/PLAN:     1. Benign essential hypertension  Stable.  Continue current treatment plan and medications.    - Basic metabolic panel  - hydrochlorothiazide (HYDRODIURIL) 25 MG tablet; TAKE 1 TABLET (25 MG) BY MOUTH DAILY  Dispense: 90 tablet; Refill: 3    2. Palpitations  Normal EKG.  Patient to try ranitidine as below.  If no improvement in symptoms, she will return to clinic for further eval.  She was advised to seek emergency care if chest pressure does not resolve.    - EKG 12-lead complete w/read - Clinics  - CBC with platelets and differential  - TSH with free T4 reflex    3. Gastroesophageal reflux disease without esophagitis    - ranitidine (ZANTAC) 150 MG tablet; Take 1 tablet (150 mg) by mouth 2 times daily  Dispense: 60 tablet; Refill: 3    4. Periodic headache syndrome, not intractable    - isometheptene-dichloralphenazone-acetaminophen (MIDRIN) -325 MG per capsule; Take 1 capsule by mouth every 4 hours as needed for headaches  Dispense: 30 capsule; Refill: 3    FUTURE APPOINTMENTS:       - Follow-up for annual visit or as needed    RODGER Handy CNP  [facility]

## 2018-05-31 ENCOUNTER — OFFICE VISIT (OUTPATIENT)
Dept: FAMILY MEDICINE | Facility: CLINIC | Age: 53
End: 2018-05-31
Payer: COMMERCIAL

## 2018-05-31 VITALS
OXYGEN SATURATION: 100 % | SYSTOLIC BLOOD PRESSURE: 110 MMHG | TEMPERATURE: 97.9 F | WEIGHT: 143.8 LBS | HEART RATE: 59 BPM | HEIGHT: 62 IN | BODY MASS INDEX: 26.46 KG/M2 | DIASTOLIC BLOOD PRESSURE: 70 MMHG | RESPIRATION RATE: 12 BRPM

## 2018-05-31 DIAGNOSIS — R00.2 PALPITATIONS: ICD-10-CM

## 2018-05-31 DIAGNOSIS — K21.9 GASTROESOPHAGEAL REFLUX DISEASE WITHOUT ESOPHAGITIS: ICD-10-CM

## 2018-05-31 DIAGNOSIS — I10 BENIGN ESSENTIAL HYPERTENSION: Primary | ICD-10-CM

## 2018-05-31 DIAGNOSIS — G43.C0 PERIODIC HEADACHE SYNDROME, NOT INTRACTABLE: ICD-10-CM

## 2018-05-31 LAB
ANION GAP SERPL CALCULATED.3IONS-SCNC: 7 MMOL/L (ref 3–14)
BASOPHILS # BLD AUTO: 0 10E9/L (ref 0–0.2)
BASOPHILS NFR BLD AUTO: 0.2 %
BUN SERPL-MCNC: 22 MG/DL (ref 7–30)
CALCIUM SERPL-MCNC: 9.6 MG/DL (ref 8.5–10.1)
CHLORIDE SERPL-SCNC: 98 MMOL/L (ref 94–109)
CO2 SERPL-SCNC: 33 MMOL/L (ref 20–32)
CREAT SERPL-MCNC: 0.7 MG/DL (ref 0.52–1.04)
DIFFERENTIAL METHOD BLD: NORMAL
EOSINOPHIL # BLD AUTO: 0.1 10E9/L (ref 0–0.7)
EOSINOPHIL NFR BLD AUTO: 1.7 %
ERYTHROCYTE [DISTWIDTH] IN BLOOD BY AUTOMATED COUNT: 12.4 % (ref 10–15)
GFR SERPL CREATININE-BSD FRML MDRD: 87 ML/MIN/1.7M2
GLUCOSE SERPL-MCNC: 100 MG/DL (ref 70–99)
HCT VFR BLD AUTO: 36.5 % (ref 35–47)
HGB BLD-MCNC: 12.4 G/DL (ref 11.7–15.7)
LYMPHOCYTES # BLD AUTO: 2.1 10E9/L (ref 0.8–5.3)
LYMPHOCYTES NFR BLD AUTO: 40 %
MCH RBC QN AUTO: 29.9 PG (ref 26.5–33)
MCHC RBC AUTO-ENTMCNC: 34 G/DL (ref 31.5–36.5)
MCV RBC AUTO: 88 FL (ref 78–100)
MONOCYTES # BLD AUTO: 0.5 10E9/L (ref 0–1.3)
MONOCYTES NFR BLD AUTO: 10.3 %
NEUTROPHILS # BLD AUTO: 2.5 10E9/L (ref 1.6–8.3)
NEUTROPHILS NFR BLD AUTO: 47.8 %
PLATELET # BLD AUTO: 218 10E9/L (ref 150–450)
POTASSIUM SERPL-SCNC: 3.2 MMOL/L (ref 3.4–5.3)
RBC # BLD AUTO: 4.15 10E12/L (ref 3.8–5.2)
SODIUM SERPL-SCNC: 138 MMOL/L (ref 133–144)
TSH SERPL DL<=0.005 MIU/L-ACNC: 0.96 MU/L (ref 0.4–4)
WBC # BLD AUTO: 5.2 10E9/L (ref 4–11)

## 2018-05-31 PROCEDURE — 85025 COMPLETE CBC W/AUTO DIFF WBC: CPT | Performed by: NURSE PRACTITIONER

## 2018-05-31 PROCEDURE — 36415 COLL VENOUS BLD VENIPUNCTURE: CPT | Performed by: NURSE PRACTITIONER

## 2018-05-31 PROCEDURE — 80048 BASIC METABOLIC PNL TOTAL CA: CPT | Performed by: NURSE PRACTITIONER

## 2018-05-31 PROCEDURE — 93000 ELECTROCARDIOGRAM COMPLETE: CPT | Performed by: NURSE PRACTITIONER

## 2018-05-31 PROCEDURE — 84443 ASSAY THYROID STIM HORMONE: CPT | Performed by: NURSE PRACTITIONER

## 2018-05-31 PROCEDURE — 99214 OFFICE O/P EST MOD 30 MIN: CPT | Performed by: NURSE PRACTITIONER

## 2018-05-31 RX ORDER — HYDROCHLOROTHIAZIDE 25 MG/1
TABLET ORAL
Qty: 90 TABLET | Refills: 3 | Status: SHIPPED | OUTPATIENT
Start: 2018-05-31 | End: 2020-01-16 | Stop reason: ALTCHOICE

## 2018-05-31 ASSESSMENT — PAIN SCALES - GENERAL: PAINLEVEL: SEVERE PAIN (6)

## 2018-05-31 NOTE — MR AVS SNAPSHOT
After Visit Summary   5/31/2018    Vilma Barber    MRN: [id number]           Patient Information     Date Of Birth          1965        Visit Information        Provider Department      5/31/2018 7:20 AM Angela Reilly APRN CNP [facility]        Today's Diagnoses     Benign essential hypertension    -  1    Palpitations        Gastroesophageal reflux disease without esophagitis        Periodic headache syndrome, not intractable          Care Instructions    Start ranitidine twice daily.  If your palpitations and chest pressure do not improve, please follow-up in clinic in 1-2 weeks.    [facility]    If you have any questions regarding to your visit please contact your care team:     Team Pink:   Clinic Hours Telephone Number   Internal Medicine:  Dr. Alix Reilly, NP       7am-7pm  Monday - Thursday   7am-5pm  Fridays  [phone]  (Appointment scheduling available 24/7)    Questions about your recent visit?  Team Line  [phone]   Urgent Care - [place] and [place] - 11am-9pm Monday-Friday Saturday-Sunday- 9am-5pm   [place] - 5pm-9pm Monday-Friday Saturday-Sunday- 9am-5pm  [phone] - [place]  [phone] - [place]       What options do I have for a visit other than an office visit? We offer electronic visits (e-visits) and telephone visits, when medically appropriate.  Please check with your medical insurance to see if these types of visits are covered, as you will be responsible for any charges that are not paid by your insurance.      You can use [employer] (secure electronic communication) to access to your chart, send your primary care provider a message, or make an appointment. Ask a team member how to get started.     For a price quote for your services, please call our Consumer Price Line at [phone] or our Imaging Cost estimation line at [phone] (for imaging  "tests).  Luz Maria Don CMA             Follow-ups after your visit        Who to contact     If you have questions or need follow up information about today's clinic visit or your schedule please contact [facility] [place] directly at [phone].  Normal or non-critical lab and imaging results will be communicated to you by [employer], letter or phone within 4 business days after the clinic has received the results. If you do not hear from us within 7 days, please contact the clinic through [employer] or phone. If you have a critical or abnormal lab result, we will notify you by phone as soon as possible.  Submit refill requests through [employer] or call your pharmacy and they will forward the refill request to us. Please allow 3 business days for your refill to be completed.          Additional Information About Your Visit        [employer] Information     [employer] lets you send messages to your doctor, view your test results, renew your prescriptions, schedule appointments and more. To sign up, go to [email or web address] . Click on \"Log in\" on the left side of the screen, which will take you to the Welcome page. Then click on \"Sign up Now\" on the right side of the page.     You will be asked to enter the access code listed below, as well as some personal information. Please follow the directions to create your username and password.     Your access code is: [email or web address]  Expires: 2018  7:19 AM     Your access code will  in 90 days. If you need help or a new code, please call your [facility] clinic or [phone].        Care EveryWhere ID     This is your Care EveryWhere ID. This could be used by other organizations to access your [facility] medical records  [id number]        Your Vitals Were     Pulse Temperature Respirations Height Pulse Oximetry BMI (Body Mass Index)    59 97.9  F (36.6  C) (Oral) 12 5' 1.5\" (1.562 m) 100% 26.73 kg/m2       Blood Pressure from Last 3 Encounters:   18 110/70 "   04/26/17 116/80   04/07/17 122/76    Weight from Last 3 Encounters:   05/31/18 143 lb 12.8 oz (65.2 kg)   04/26/17 135 lb 9.6 oz (61.5 kg)   04/07/17 136 lb (61.7 kg)              We Performed the Following     Basic metabolic panel     CBC with platelets and differential     EKG 12-lead complete w/read - Clinics     TSH with free T4 reflex          Today's Medication Changes          These changes are accurate as of 5/31/18  8:18 AM.  If you have any questions, ask your nurse or doctor.               Start taking these medicines.        Dose/Directions    isometheptene-dichloralphenazone-acetaminophen -325 MG per capsule   Commonly known as:  MIDRIN   Used for:  Periodic headache syndrome, not intractable   Started by:  Angela Reilly APRN CNP        Dose:  1 capsule   Take 1 capsule by mouth every 4 hours as needed for headaches   Quantity:  30 capsule   Refills:  3         These medicines have changed or have updated prescriptions.        Dose/Directions    hydrochlorothiazide 25 MG tablet   Commonly known as:  HYDRODIURIL   This may have changed:  See the new instructions.   Used for:  Benign essential hypertension   Changed by:  Angela Reilly APRN CNP        TAKE 1 TABLET (25 MG) BY MOUTH DAILY   Quantity:  90 tablet   Refills:  3       * ranitidine 150 MG tablet   Commonly known as:  ZANTAC   This may have changed:  Another medication with the same name was added. Make sure you understand how and when to take each.   Changed by:  Angela Reilly APRN CNP        Dose:  150 mg   Take 150 mg by mouth At Bedtime   Refills:  0       * ranitidine 150 MG tablet   Commonly known as:  ZANTAC   This may have changed:  You were already taking a medication with the same name, and this prescription was added. Make sure you understand how and when to take each.   Used for:  Gastroesophageal reflux disease without esophagitis   Changed by:  Angela Reilly APRN CNP         Dose:  150 mg   Take 1 tablet (150 mg) by mouth 2 times daily   Quantity:  60 tablet   Refills:  3       * Notice:  This list has 2 medication(s) that are the same as other medications prescribed for you. Read the directions carefully, and ask your doctor or other care provider to review them with you.         Where to get your medicines      These medications were sent to [facility] [facility] - [place], [place] - [address]  [address] [address]     Phone:  [phone]     hydrochlorothiazide 25 MG tablet    ranitidine 150 MG tablet         Some of these will need a paper prescription and others can be bought over the counter.  Ask your nurse if you have questions.     Bring a paper prescription for each of these medications     isometheptene-dichloralphenazone-acetaminophen -325 MG per capsule                Primary Care Provider Office Phone # Fax #    Angela RODGER Hernandez [facility] [phone]       [address] [place] [address] [address]  [place] [address]        Equal Access to Services     [facility] AH: Hadii aad ku hadasho Soomaali, waaxda luqadaha, qaybta kaalmada adeegyada, waxay idiin hayanitan nataliia kharaniraj ceja . So [facility] [phone].    ATENCIÓN: Si habla español, tiene a gr disposición servicios gratuitos de asistencia lingüística. Llame al [phone].    We comply with applicable federal civil rights laws and [place] laws. We do not discriminate on the basis of race, color, national origin, age, disability, sex, sexual orientation, or gender identity.            Thank you!     Thank you for choosing [facility]  for your care. Our goal is always to provide you with excellent care. Hearing back from our patients is one way we can continue to improve our services. Please take a few minutes to complete the written survey that you may receive in the mail after your visit with us. Thank you!             Your Updated Medication List - Protect others around you: Learn how  to safely use, store and throw away your medicines at www.disposemymeds.org.          This list is accurate as of 5/31/18  8:18 AM.  Always use your most recent med list.                   Brand Name Dispense Instructions for use Diagnosis    aspirin 81 MG tablet      Take 81 mg by mouth daily        cyclobenzaprine 10 MG tablet    FLEXERIL    90 tablet    Take 0.5 tablets (5 mg) by mouth nightly as needed for muscle spasms    Periodic headache syndrome, not intractable       hydrochlorothiazide 25 MG tablet    HYDRODIURIL    90 tablet    TAKE 1 TABLET (25 MG) BY MOUTH DAILY    Benign essential hypertension       isometheptene-dichloralphenazone-acetaminophen -325 MG per capsule    MIDRIN    30 capsule    Take 1 capsule by mouth every 4 hours as needed for headaches    Periodic headache syndrome, not intractable       LORazepam 0.5 MG tablet    ATIVAN    30 tablet    TAKE 1/2 TO 1 TABLET BY MOUTH EVERY 8 HOURS AS NEEDED FOR ANXIETY    Anxiety       * ranitidine 150 MG tablet    ZANTAC     Take 150 mg by mouth At Bedtime        * ranitidine 150 MG tablet    ZANTAC    60 tablet    Take 1 tablet (150 mg) by mouth 2 times daily    Gastroesophageal reflux disease without esophagitis       * Notice:  This list has 2 medication(s) that are the same as other medications prescribed for you. Read the directions carefully, and ask your doctor or other care provider to review them with you.

## 2018-05-31 NOTE — PATIENT INSTRUCTIONS
Start ranitidine twice daily.  If your palpitations and chest pressure do not improve, please follow-up in clinic in 1-2 weeks.    [facility]    If you have any questions regarding to your visit please contact your care team:     Team Pink:   Clinic Hours Telephone Number   Internal Medicine:  Dr. Alix Reilly, NP       7am-7pm  Monday - Thursday   7am-5pm  Fridays  [phone]  (Appointment scheduling available 24/7)    Questions about your recent visit?  Team Line  [phone]   Urgent Care - [place] and [facility] - 11am-9pm Monday-Friday Saturday-Sunday- 9am-5pm   [place] - 5pm-9pm Monday-Friday Saturday-Sunday- 9am-5pm  [phone] - [place]  [phone] - [place]       What options do I have for a visit other than an office visit? We offer electronic visits (e-visits) and telephone visits, when medically appropriate.  Please check with your medical insurance to see if these types of visits are covered, as you will be responsible for any charges that are not paid by your insurance.      You can use [employer] (secure electronic communication) to access to your chart, send your primary care provider a message, or make an appointment. Ask a team member how to get started.     For a price quote for your services, please call our Consumer Price Line at [phone] or our Imaging Cost estimation line at [phone] (for imaging tests).  Luz Maria Don CMA

## 2018-06-01 ENCOUNTER — TELEPHONE (OUTPATIENT)
Dept: INTERNAL MEDICINE | Facility: CLINIC | Age: 53
End: 2018-06-01

## 2018-06-01 DIAGNOSIS — Z13.6 CARDIOVASCULAR SCREENING; LDL GOAL LESS THAN 160: ICD-10-CM

## 2018-06-01 DIAGNOSIS — E87.6 HYPOKALEMIA: Primary | ICD-10-CM

## 2018-06-01 RX ORDER — POTASSIUM CHLORIDE 1500 MG/1
20 TABLET, EXTENDED RELEASE ORAL DAILY
Qty: 4 TABLET | Refills: 0 | Status: SHIPPED | OUTPATIENT
Start: 2018-06-01 | End: 2018-06-05

## 2018-06-01 ASSESSMENT — PATIENT HEALTH QUESTIONNAIRE - PHQ9: SUM OF ALL RESPONSES TO PHQ QUESTIONS 1-9: 3

## 2018-06-01 NOTE — TELEPHONE ENCOUNTER
Result Notes     Notes Recorded by Angela Reilly APRN CNP on 6/1/2018 at 8:51 AM  Please call patient-    Her potassium is mildly decreased.  Please have her start potassium chloride 20 meQ daily x 4 days and return for repeat bmp next week.  Her other labs are normal.    Thanks,  Angela Reilly CNP

## 2018-06-01 NOTE — TELEPHONE ENCOUNTER
Patient notified of Provider's message as written.  Patient verbalized understanding.    Lab appointment scheduled for 6/8/18, will come fasting    Patient asking if she can get her FLP as well  No need to call patient back if provider approves FLP    Clement Arechiga RN

## 2018-06-08 DIAGNOSIS — E87.6 HYPOKALEMIA: ICD-10-CM

## 2018-06-08 DIAGNOSIS — Z13.6 CARDIOVASCULAR SCREENING; LDL GOAL LESS THAN 160: ICD-10-CM

## 2018-06-08 LAB
ANION GAP SERPL CALCULATED.3IONS-SCNC: 7 MMOL/L (ref 3–14)
BUN SERPL-MCNC: 20 MG/DL (ref 7–30)
CALCIUM SERPL-MCNC: 9.6 MG/DL (ref 8.5–10.1)
CHLORIDE SERPL-SCNC: 101 MMOL/L (ref 94–109)
CHOLEST SERPL-MCNC: 228 MG/DL
CO2 SERPL-SCNC: 32 MMOL/L (ref 20–32)
CREAT SERPL-MCNC: 0.73 MG/DL (ref 0.52–1.04)
GFR SERPL CREATININE-BSD FRML MDRD: 84 ML/MIN/1.7M2
GLUCOSE SERPL-MCNC: 93 MG/DL (ref 70–99)
HDLC SERPL-MCNC: 49 MG/DL
LDLC SERPL CALC-MCNC: 138 MG/DL
NONHDLC SERPL-MCNC: 179 MG/DL
POTASSIUM SERPL-SCNC: 3.7 MMOL/L (ref 3.4–5.3)
SODIUM SERPL-SCNC: 140 MMOL/L (ref 133–144)
TRIGL SERPL-MCNC: 203 MG/DL

## 2018-06-08 PROCEDURE — 80048 BASIC METABOLIC PNL TOTAL CA: CPT | Performed by: NURSE PRACTITIONER

## 2018-06-08 PROCEDURE — 80061 LIPID PANEL: CPT | Performed by: NURSE PRACTITIONER

## 2018-06-08 PROCEDURE — 36415 COLL VENOUS BLD VENIPUNCTURE: CPT | Performed by: NURSE PRACTITIONER

## 2018-07-11 NOTE — PROGRESS NOTES
SUBJECTIVE:   Vilma Barber is a 52 year old female who presents to clinic today for the following health issues:        Hypertension Follow-up      Outpatient blood pressures are being checked at home.  Results are 136/94.    Low Salt Diet: no added salt      Amount of exercise or physical activity: 2-3 days/week for an average of greater than 60 minutes    Problems taking medications regularly: No    Medication side effects: none    Diet: regular (no restrictions)      Medication Followup of Ranitidine    Taking Medication as prescribed: yes    Side Effects:  None    Medication Helping Symptoms:  yes     Patient notes that the burning, chest pressure, and palpitations have decreased.  She stopped ranitidine because she felt she did not need it.  Patient notes some intermittent dizziness accompanied by nausea and tinnitus.  She denies vision changes, weakness.  Dizziness happens regardless of position.    Joint Pain    Onset: more than a month    Description:   Location: right plantar mid foot, heel  Character: Dull ache and Fullness    Intensity: moderate    Progression of Symptoms: same    Accompanying Signs & Symptoms:  Other symptoms: none    History:   Previous similar pain: no       Precipitating factors:   Trauma or overuse: no     Alleviating factors:  Improved by: nothing    Therapies Tried and outcome: nothing    Pain is worse in the am with first starting to walk and then it improves.  Patient has not been taking anything for pain.  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  She wears good hard soled shoes at work.  She wears a flat slipper at home.      Problem list and histories reviewed & adjusted, as indicated.  Additional history: as documented    Patient Active Problem List   Diagnosis     GERD (gastroesophageal reflux disease)     CARDIOVASCULAR SCREENING; LDL GOAL LESS THAN 160     Iron deficiency     Migraines     Gilbert's disease     Breast mass     Benign essential  hypertension     Back pain     Family history of breast cancer in first degree relative     Neck pain     Anxiety     HTN (hypertension)     History reviewed. No pertinent surgical history.    Social History   Substance Use Topics     Smoking status: Never Smoker     Smokeless tobacco: Never Used      Comment: smoke free household.     Alcohol use Yes      Comment: occ     Family History   Problem Relation Age of Onset     HEART DISEASE Brother      Asthma Son      Hypertension Mother      HEART DISEASE Mother      Glaucoma No family hx of      Macular Degeneration No family hx of      Cancer No family hx of      Diabetes No family hx of      Thyroid Disease No family hx of      Anesthesia Reaction No family hx of          Current Outpatient Prescriptions   Medication Sig Dispense Refill     aspirin 81 MG tablet Take 81 mg by mouth daily       cyclobenzaprine (FLEXERIL) 10 MG tablet Take 0.5 tablets (5 mg) by mouth nightly as needed for muscle spasms 90 tablet 1     hydrochlorothiazide (HYDRODIURIL) 25 MG tablet TAKE 1 TABLET (25 MG) BY MOUTH DAILY 90 tablet 3     isometheptene-dichloralphenazone-acetaminophen (MIDRIN) -325 MG per capsule Take 1 capsule by mouth every 4 hours as needed for headaches 30 capsule 3     LORazepam (ATIVAN) 0.5 MG tablet TAKE 1/2 TO 1 TABLET BY MOUTH EVERY 8 HOURS AS NEEDED FOR ANXIETY 30 tablet 0     ranitidine (ZANTAC) 150 MG tablet Take 1 tablet (150 mg) by mouth 2 times daily (Patient not taking: Reported on 7/12/2018) 60 tablet 3     No Known Allergies  BP Readings from Last 3 Encounters:   07/12/18 120/80   05/31/18 110/70   04/26/17 116/80    Wt Readings from Last 3 Encounters:   07/12/18 141 lb 12.8 oz (64.3 kg)   05/31/18 143 lb 12.8 oz (65.2 kg)   04/26/17 135 lb 9.6 oz (61.5 kg)                  Labs reviewed in EPIC    Reviewed and updated as needed this visit by clinical staff       Reviewed and updated as needed this visit by Provider         ROS:  Constitutional,  "HEENT, cardiovascular, pulmonary, gi and gu systems are negative, except as otherwise noted.    OBJECTIVE:     /80  Pulse 62  Temp 97.6  F (36.4  C) (Oral)  Resp 16  Ht 5' 1.5\" (1.562 m)  Wt 141 lb 12.8 oz (64.3 kg)  SpO2 99%  BMI 26.36 kg/m2  Body mass index is 26.36 kg/(m^2).  GENERAL: healthy, alert and no distress  EYES: Eyes grossly normal to inspection, PERRL and conjunctivae and sclerae normal  HENT: ear canals and TM's normal, nose and mouth without ulcers or lesions  NECK: no adenopathy, no asymmetry, masses, or scars and thyroid normal to palpation  RESP: lungs clear to auscultation - no rales, rhonchi or wheezes  CV: regular rate and rhythm, normal S1 S2, no S3 or S4, no murmur, click or rub, no peripheral edema and peripheral pulses strong  MS: Right foot: tenderness to plantar mid foot; full range of motion of right ankle, all digits right foot present.    Diagnostic Test Results:  none     ASSESSMENT/PLAN:     1. Hypertension, unspecified type  Stable.  Continue current treatment plan and medications.      2. Tinnitus, bilateral  Possible Meniere's.  Will send to ENT for eval.  - OTOLARYNGOLOGY REFERRAL    3. Dizziness    - OTOLARYNGOLOGY REFERRAL    4. Chest pressure  Resolved.  Patient to use ranitidine prn for symptoms.    5. Plantar fasciitis  See patient instructions.      FUTURE APPOINTMENTS:       - Follow-up for annual visit or as needed    RODGER Handy [facility]  3  "

## 2018-07-12 ENCOUNTER — OFFICE VISIT (OUTPATIENT)
Dept: FAMILY MEDICINE | Facility: CLINIC | Age: 53
End: 2018-07-12
Payer: COMMERCIAL

## 2018-07-12 VITALS
TEMPERATURE: 97.6 F | OXYGEN SATURATION: 99 % | RESPIRATION RATE: 16 BRPM | SYSTOLIC BLOOD PRESSURE: 120 MMHG | WEIGHT: 141.8 LBS | DIASTOLIC BLOOD PRESSURE: 80 MMHG | HEART RATE: 62 BPM | BODY MASS INDEX: 26.09 KG/M2 | HEIGHT: 62 IN

## 2018-07-12 DIAGNOSIS — H93.13 TINNITUS, BILATERAL: ICD-10-CM

## 2018-07-12 DIAGNOSIS — R07.89 CHEST PRESSURE: ICD-10-CM

## 2018-07-12 DIAGNOSIS — R42 DIZZINESS: ICD-10-CM

## 2018-07-12 DIAGNOSIS — I10 HYPERTENSION, UNSPECIFIED TYPE: Primary | ICD-10-CM

## 2018-07-12 DIAGNOSIS — M72.2 PLANTAR FASCIITIS: ICD-10-CM

## 2018-07-12 PROCEDURE — 99214 OFFICE O/P EST MOD 30 MIN: CPT | Performed by: NURSE PRACTITIONER

## 2018-07-12 ASSESSMENT — PAIN SCALES - GENERAL: PAINLEVEL: SEVERE PAIN (6)

## 2018-07-12 NOTE — MR AVS SNAPSHOT
After Visit Summary   7/12/2018    Vilma Barber    MRN: [id number]           Patient Information     Date Of Birth          1965        Visit Information        Provider Department      7/12/2018 7:00 AM Angela Reilly APRN [facility]        Today's Diagnoses     Hypertension, unspecified type    -  1    Tinnitus, bilateral        Dizziness        Chest pressure        Plantar fasciitis          Care Instructions    Wear hard soled shoes at all times.  Stretch your foot as below.  Ice by rolling on a frozen water bottle.  Take ibuprofen/advil 400 mg every 6 hours as needed for pain or acetaminophen/tylenol 1000 mg every 8 hours as needed for pain.  This can take up to 6 weeks to improve.    Understanding Plantar Fasciitis    Plantar fasciitis is a condition that causes foot and heel pain. The plantar fascia is a tough band of tissue that runs across the bottom of the foot from the heel to the toes. This tissue pulls on the heel bone. It supports the arch of the foot as it pushes off the ground. If the tissue becomes irritated or red and swollen (inflamed), it is called plantar fasciitis.  How to say it  PLAN-tuhr fa-see-IY-tis   What causes plantar fasciitis?  Plantar fasciitis most often occurs from overusing the plantar fascia. The tissue may become damaged from activities that put repeated stress on the heel and foot. Or it may wear down over time with age and ankle stiffness. You are more likely to have plantar fasciitis if you:    Do activities that require a lot of running, jumping, or dancing    Have a job that requires being on your feet for long periods    Are overweight or obese    Have certain foot problems, such as a tight Achilles tendon, flat feet, or high arches    Often wear poorly fitting shoes  Symptoms of plantar fasciitis  The condition most often causes pain in the heel and the bottom of the foot. The pain may occur when you take your first  steps in the morning. It may get better as you walk throughout the day. But as you continue to put weight on the foot, the pain often returns. Pain may also occur after standing or sitting for long periods.  Treating plantar fasciitis  Treatments for plantar fasciitis include:    Resting the foot. This involves limiting movements that make your foot hurt. You may also need to avoid certain sports and types of work for a time.    Using cold packs. Put an ice pack on the heel and foot to help reduce pain and swelling.    Taking pain medicines. Prescription and over-the-counter pain medicines can help relieve pain and swelling.    Using heel cups or foot inserts (orthotics). These are placed in the shoes to help support the heel or arch and cushion the heel. You may also be told to buy proper-fitting shoes with good arch support and cushioned soles.    Taping the foot. This supports the arch and limits the movement of the plantar fascia to help relieve symptoms.    Wearing a night splint. This stretches the plantar fascia and leg muscles while you sleep. This may help relieve pain.    Doing exercises and physical therapy. These stretch and strengthen the plantar fascia and the muscles in the leg that support the heel and foot.    Getting shots of medicine into the foot. These may help relieve symptoms for a time.    Having surgery. This may be needed if other treatments fail to relieve symptoms. During surgery, the surgeon may partially cut the plantar fascia to release tension.  Possible complications of plantar fasciitis  Without proper care and treatment, healing may take longer than normal. Also, symptoms may continue or get worse. Over time, the plantar fascia may be damaged. This can make it hard to walk or even stand without pain.  When to call your healthcare provider  Call your healthcare provider right away if you have any of these:    Fever of 100.4 F (38 C) or higher, or as directed    Symptoms that don t  get better with treatment, or get worse    New symptoms, such as numbness, tingling, or weakness in the foot   Date Last Reviewed: 3/10/2016    6539-4654 [employer]. [address] [place] [address]. All rights reserved. This information is not intended as a substitute for professional medical care. Always follow your healthcare professional's instructions.        Toe Extension (Flexibility)    These instructions are for your right foot. Switch sides for your left foot.  1. Sit in a chair. Rest your right ankle on your left knee.  2. Hold your toes with your right hand. Gently bend the toes backward. Feel a stretch in the undersides of the toes and ball of the foot. Hold for 30 to 60 seconds.  3. Then gently bend the toes in the other direction. Gently press on them until your foot is pointed. Hold for 30 to 60 seconds.  4. Repeat 5 times, or as instructed.  Date Last Reviewed: 5/1/2016 2000-2017 [employer]. [address] [place] [address]. All rights reserved. This information is not intended as a substitute for professional medical care. Always follow your healthcare professional's instructions.        Ankle Dorsiflexion/Plantarflexion (Flexibility)    1. Sit on the floor or in bed with your legs straight in front of you.  2. Point both feet. Then flex both feet.  3. Do this 10 to 30 times in a row.  4. Repeat this exercise 2 times a day, or as instructed.  Date Last Reviewed: 5/1/2016 2000-2017 [employer]. [address] [place] [address]. All rights reserved. This information is not intended as a substitute for professional medical care. Always follow your healthcare professional's instructions.      [facility] (formerly [facility])    If you have any questions regarding to your visit please contact your care team:     Team Pink:   Clinic Hours Telephone Number   Internal Medicine:  Dr. Alix Reilly,  NP       7am-7pm  Monday - Thursday   7am-5pm  Fridays  [phone]  (Appointment scheduling available 24/7)    Questions about your recent visit?  Team Line  [phone]   Urgent Care - [place] - 11am-9pm Monday-Friday Saturday-Sunday- 9am-5pm   [place] - 5pm-9pm Monday-Friday Saturday-Sunday- 9am-5pm  [phone] - Yamilex Lizama  [phone] - [place]       What options do I have for a visit other than an office visit? We offer electronic visits (e-visits) and telephone visits, when medically appropriate.  Please check with your medical insurance to see if these types of visits are covered, as you will be responsible for any charges that are not paid by your insurance.      You can use [employer] (secure electronic communication) to access to your chart, send your primary care provider a message, or make an appointment. Ask a team member how to get started.     For a price quote for your services, please call our Consumer Price Line at [phone] or our Imaging Cost estimation line at [phone] (for imaging tests).'  Luz Maria Don CMA             Follow-ups after your visit        Additional Services     OTOLARYNGOLOGY REFERRAL       Your provider has referred you to: [facility]: [facility] [place] [phone]   [email or web address]    Please be aware that coverage of these services is subject to the terms and limitations of your health insurance plan.  Call member services at your health plan with any benefit or coverage questions.      Please bring the following with you to your appointment:    (1) Any X-Rays, CTs or MRIs which have been performed.  Contact the facility where they were done to arrange for  prior to your scheduled appointment.   (2) List of current medications  (3) This referral request   (4) Any documents/labs given to you for this referral                  Who to contact     If you have questions or need follow  "up information about today's clinic visit or your schedule please contact [facility] [place] directly at [phone].  Normal or non-critical lab and imaging results will be communicated to you by MyChart, letter or phone within 4 business days after the clinic has received the results. If you do not hear from us within 7 days, please contact the clinic through [place] or phone. If you have a critical or abnormal lab result, we will notify you by phone as soon as possible.  Submit refill requests through [employer] or call your pharmacy and they will forward the refill request to us. Please allow 3 business days for your refill to be completed.          Additional Information About Your Visit        [facility] Information     [employer] lets you send messages to your doctor, view your test results, renew your prescriptions, schedule appointments and more. To sign up, go to [email or web address] . Click on \"Log in\" on the left side of the screen, which will take you to the Welcome page. Then click on \"Sign up Now\" on the right side of the page.     You will be asked to enter the access code listed below, as well as some personal information. Please follow the directions to create your username and password.     Your access code is: [email or web address]  Expires: 10/10/2018  7:00 AM     Your access code will  in 90 days. If you need help or a new code, please call your [place] clinic or [phone].        Care EveryWhere ID     This is your Care EveryWhere ID. This could be used by other organizations to access your [facility] medical records  [id number]        Your Vitals Were     Pulse Temperature Respirations Height Pulse Oximetry BMI (Body Mass Index)    62 97.6  F (36.4  C) (Oral) 16 5' 1.5\" (1.562 m) 99% 26.36 kg/m2       Blood Pressure from Last 3 Encounters:   18 120/80   18 110/70   17 116/80    Weight from Last 3 Encounters:   18 141 lb 12.8 oz (64.3 kg)   18 143 lb 12.8 oz " (65.2 kg)   04/26/17 135 lb 9.6 oz (61.5 kg)              We Performed the Following     OTOLARYNGOLOGY REFERRAL        Primary Care Provider Office Phone # Fax #    Angela Reilly APRGENEVIEVE ORANTES [phone] [address] [facility]  [place] [address]        Equal Access to Services     [facility]: Hadii aad ku hadasho Soomaali, waaxda luqadaha, qaybta kaalmada adeegyada, waxay idiin hayaan adeeg kharash la'aan . So [facility] [phone].    ATENCIÓN: Si habla español, tiene a gr disposición servicios gratuitos de asistencia lingüística. Llame al [phone].    We comply with applicable federal civil rights laws and [place] laws. We do not discriminate on the basis of race, color, national origin, age, disability, sex, sexual orientation, or gender identity.            Thank you!     Thank you for choosing [facility]  for your care. Our goal is always to provide you with excellent care. Hearing back from our patients is one way we can continue to improve our services. Please take a few minutes to complete the written survey that you may receive in the mail after your visit with us. Thank you!             Your Updated Medication List - Protect others around you: Learn how to safely use, store and throw away your medicines at www.disposemymeds.org.          This list is accurate as of 7/12/18  7:34 AM.  Always use your most recent med list.                   Brand Name Dispense Instructions for use Diagnosis    aspirin 81 MG tablet      Take 81 mg by mouth daily        cyclobenzaprine 10 MG tablet    FLEXERIL    90 tablet    Take 0.5 tablets (5 mg) by mouth nightly as needed for muscle spasms    Periodic headache syndrome, not intractable       hydrochlorothiazide 25 MG tablet    HYDRODIURIL    90 tablet    TAKE 1 TABLET (25 MG) BY MOUTH DAILY    Benign essential hypertension       isometheptene-dichloralphenazone-acetaminophen -325 MG per capsule    MIDRIN    30 capsule     Take 1 capsule by mouth every 4 hours as needed for headaches    Periodic headache syndrome, not intractable       LORazepam 0.5 MG tablet    ATIVAN    30 tablet    TAKE 1/2 TO 1 TABLET BY MOUTH EVERY 8 HOURS AS NEEDED FOR ANXIETY    Anxiety       ranitidine 150 MG tablet    ZANTAC    60 tablet    Take 1 tablet (150 mg) by mouth 2 times daily    Gastroesophageal reflux disease without esophagitis

## 2018-07-12 NOTE — PATIENT INSTRUCTIONS
Wear hard soled shoes at all times.  Stretch your foot as below.  Ice by rolling on a frozen water bottle.  Take ibuprofen/advil 400 mg every 6 hours as needed for pain or acetaminophen/tylenol 1000 mg every 8 hours as needed for pain.  This can take up to 6 weeks to improve.    Understanding Plantar Fasciitis    Plantar fasciitis is a condition that causes foot and heel pain. The plantar fascia is a tough band of tissue that runs across the bottom of the foot from the heel to the toes. This tissue pulls on the heel bone. It supports the arch of the foot as it pushes off the ground. If the tissue becomes irritated or red and swollen (inflamed), it is called plantar fasciitis.  How to say it  PLAN-tuhr fa-see-IY-tis   What causes plantar fasciitis?  Plantar fasciitis most often occurs from overusing the plantar fascia. The tissue may become damaged from activities that put repeated stress on the heel and foot. Or it may wear down over time with age and ankle stiffness. You are more likely to have plantar fasciitis if you:    Do activities that require a lot of running, jumping, or dancing    Have a job that requires being on your feet for long periods    Are overweight or obese    Have certain foot problems, such as a tight Achilles tendon, flat feet, or high arches    Often wear poorly fitting shoes  Symptoms of plantar fasciitis  The condition most often causes pain in the heel and the bottom of the foot. The pain may occur when you take your first steps in the morning. It may get better as you walk throughout the day. But as you continue to put weight on the foot, the pain often returns. Pain may also occur after standing or sitting for long periods.  Treating plantar fasciitis  Treatments for plantar fasciitis include:    Resting the foot. This involves limiting movements that make your foot hurt. You may also need to avoid certain sports and types of work for a time.    Using cold packs. Put an ice pack on the  heel and foot to help reduce pain and swelling.    Taking pain medicines. Prescription and over-the-counter pain medicines can help relieve pain and swelling.    Using heel cups or foot inserts (orthotics). These are placed in the shoes to help support the heel or arch and cushion the heel. You may also be told to buy proper-fitting shoes with good arch support and cushioned soles.    Taping the foot. This supports the arch and limits the movement of the plantar fascia to help relieve symptoms.    Wearing a night splint. This stretches the plantar fascia and leg muscles while you sleep. This may help relieve pain.    Doing exercises and physical therapy. These stretch and strengthen the plantar fascia and the muscles in the leg that support the heel and foot.    Getting shots of medicine into the foot. These may help relieve symptoms for a time.    Having surgery. This may be needed if other treatments fail to relieve symptoms. During surgery, the surgeon may partially cut the plantar fascia to release tension.  Possible complications of plantar fasciitis  Without proper care and treatment, healing may take longer than normal. Also, symptoms may continue or get worse. Over time, the plantar fascia may be damaged. This can make it hard to walk or even stand without pain.  When to call your healthcare provider  Call your healthcare provider right away if you have any of these:    Fever of 100.4 F (38 C) or higher, or as directed    Symptoms that don t get better with treatment, or get worse    New symptoms, such as numbness, tingling, or weakness in the foot   Date Last Reviewed: 3/10/2016    7804-3078 [employer]. [address] [place] [address], [place] [address]. All rights reserved. This information is not intended as a substitute for professional medical care. Always follow your healthcare professional's instructions.        Toe Extension (Flexibility)    These instructions are for your right foot. Switch  sides for your left foot.  1. Sit in a chair. Rest your right ankle on your left knee.  2. Hold your toes with your right hand. Gently bend the toes backward. Feel a stretch in the undersides of the toes and ball of the foot. Hold for 30 to 60 seconds.  3. Then gently bend the toes in the other direction. Gently press on them until your foot is pointed. Hold for 30 to 60 seconds.  4. Repeat 5 times, or as instructed.  Date Last Reviewed: 5/1/2016 2000-2017 [employer] [address] [place] [address]. All rights reserved. This information is not intended as a substitute for professional medical care. Always follow your healthcare professional's instructions.        Ankle Dorsiflexion/Plantarflexion (Flexibility)    1. Sit on the floor or in bed with your legs straight in front of you.  2. Point both feet. Then flex both feet.  3. Do this 10 to 30 times in a row.  4. Repeat this exercise 2 times a day, or as instructed.  Date Last Reviewed: 5/1/2016 2000-2017 [employer] [address] [place] [address]. All rights reserved. This information is not intended as a substitute for professional medical care. Always follow your healthcare professional's instructions.      [facility]    If you have any questions regarding to your visit please contact your care team:     Team Pink:   Clinic Hours Telephone Number   Internal Medicine:  Dr. Alix Reilly NP       7am-7pm  Monday - Thursday   7am-5pm  Fridays  [phone]  (Appointment scheduling available 24/7)    Questions about your recent visit?  Team Line  [phone]   Urgent Care - [place] - 11am-9pm Monday-Friday Saturday-Sunday- 9am-5pm   [place] - 5pm-9pm Monday-Friday Saturday-Sunday- 9am-5pm  [phone] - [place]  [phone] - [place]       What options do I have for a visit other than an office visit? We offer  electronic visits (e-visits) and telephone visits, when medically appropriate.  Please check with your medical insurance to see if these types of visits are covered, as you will be responsible for any charges that are not paid by your insurance.      You can use [employer] (secure electronic communication) to access to your chart, send your primary care provider a message, or make an appointment. Ask a team member how to get started.     For a price quote for your services, please call our Consumer Price Line at [phone] or our Imaging Cost estimation line at [phone] (for imaging tests).'  Luz Maria Don CMA

## 2018-07-16 ENCOUNTER — TRANSFERRED RECORDS (OUTPATIENT)
Dept: HEALTH INFORMATION MANAGEMENT | Facility: CLINIC | Age: 53
End: 2018-07-16

## 2018-07-16 LAB
HPV ABSTRACT: NORMAL
PAP-ABSTRACT: NORMAL

## 2018-07-26 ENCOUNTER — OFFICE VISIT (OUTPATIENT)
Dept: AUDIOLOGY | Facility: CLINIC | Age: 53
End: 2018-07-26
Payer: COMMERCIAL

## 2018-07-26 ENCOUNTER — OFFICE VISIT (OUTPATIENT)
Dept: OTOLARYNGOLOGY | Facility: CLINIC | Age: 53
End: 2018-07-26
Payer: COMMERCIAL

## 2018-07-26 VITALS
HEART RATE: 73 BPM | HEIGHT: 62 IN | WEIGHT: 145.6 LBS | RESPIRATION RATE: 16 BRPM | SYSTOLIC BLOOD PRESSURE: 145 MMHG | BODY MASS INDEX: 26.79 KG/M2 | OXYGEN SATURATION: 100 % | DIASTOLIC BLOOD PRESSURE: 87 MMHG

## 2018-07-26 DIAGNOSIS — H93.13 TINNITUS, BILATERAL: Primary | ICD-10-CM

## 2018-07-26 DIAGNOSIS — R40.0 DROWSINESS: Primary | ICD-10-CM

## 2018-07-26 DIAGNOSIS — H93.13 TINNITUS OF BOTH EARS: ICD-10-CM

## 2018-07-26 LAB
ALBUMIN SERPL-MCNC: 3.8 G/DL (ref 3.4–5)
ALP SERPL-CCNC: 59 U/L (ref 40–150)
ALT SERPL W P-5'-P-CCNC: 36 U/L (ref 0–50)
ANION GAP SERPL CALCULATED.3IONS-SCNC: 9 MMOL/L (ref 3–14)
AST SERPL W P-5'-P-CCNC: 24 U/L (ref 0–45)
BASOPHILS # BLD AUTO: 0 10E9/L (ref 0–0.2)
BASOPHILS NFR BLD AUTO: 0.2 %
BILIRUB SERPL-MCNC: 1.2 MG/DL (ref 0.2–1.3)
BUN SERPL-MCNC: 10 MG/DL (ref 7–30)
CALCIUM SERPL-MCNC: 9.1 MG/DL (ref 8.5–10.1)
CHLORIDE SERPL-SCNC: 100 MMOL/L (ref 94–109)
CO2 SERPL-SCNC: 32 MMOL/L (ref 20–32)
CREAT SERPL-MCNC: 0.6 MG/DL (ref 0.52–1.04)
DIFFERENTIAL METHOD BLD: NORMAL
EOSINOPHIL # BLD AUTO: 0.1 10E9/L (ref 0–0.7)
EOSINOPHIL NFR BLD AUTO: 1.2 %
ERYTHROCYTE [DISTWIDTH] IN BLOOD BY AUTOMATED COUNT: 13.1 % (ref 10–15)
GFR SERPL CREATININE-BSD FRML MDRD: >90 ML/MIN/1.7M2
GLUCOSE SERPL-MCNC: 91 MG/DL (ref 70–99)
HCT VFR BLD AUTO: 36.3 % (ref 35–47)
HGB BLD-MCNC: 12.3 G/DL (ref 11.7–15.7)
LYMPHOCYTES # BLD AUTO: 2 10E9/L (ref 0.8–5.3)
LYMPHOCYTES NFR BLD AUTO: 34.9 %
MCH RBC QN AUTO: 29.7 PG (ref 26.5–33)
MCHC RBC AUTO-ENTMCNC: 33.9 G/DL (ref 31.5–36.5)
MCV RBC AUTO: 88 FL (ref 78–100)
MONOCYTES # BLD AUTO: 0.6 10E9/L (ref 0–1.3)
MONOCYTES NFR BLD AUTO: 9.6 %
NEUTROPHILS # BLD AUTO: 3.1 10E9/L (ref 1.6–8.3)
NEUTROPHILS NFR BLD AUTO: 54.1 %
PLATELET # BLD AUTO: 215 10E9/L (ref 150–450)
POTASSIUM SERPL-SCNC: 3.4 MMOL/L (ref 3.4–5.3)
PROT SERPL-MCNC: 7.6 G/DL (ref 6.8–8.8)
RBC # BLD AUTO: 4.14 10E12/L (ref 3.8–5.2)
SODIUM SERPL-SCNC: 141 MMOL/L (ref 133–144)
TSH SERPL DL<=0.005 MIU/L-ACNC: 0.9 MU/L (ref 0.4–4)
WBC # BLD AUTO: 5.7 10E9/L (ref 4–11)

## 2018-07-26 PROCEDURE — 80053 COMPREHEN METABOLIC PANEL: CPT | Performed by: OTOLARYNGOLOGY

## 2018-07-26 PROCEDURE — 99207 ZZC NO CHARGE LOS: CPT | Performed by: AUDIOLOGIST

## 2018-07-26 PROCEDURE — 36415 COLL VENOUS BLD VENIPUNCTURE: CPT | Performed by: OTOLARYNGOLOGY

## 2018-07-26 PROCEDURE — 92557 COMPREHENSIVE HEARING TEST: CPT | Performed by: AUDIOLOGIST

## 2018-07-26 PROCEDURE — 84443 ASSAY THYROID STIM HORMONE: CPT | Performed by: OTOLARYNGOLOGY

## 2018-07-26 PROCEDURE — 92567 TYMPANOMETRY: CPT | Performed by: AUDIOLOGIST

## 2018-07-26 PROCEDURE — 85025 COMPLETE CBC W/AUTO DIFF WBC: CPT | Performed by: OTOLARYNGOLOGY

## 2018-07-26 PROCEDURE — 99244 OFF/OP CNSLTJ NEW/EST MOD 40: CPT | Performed by: OTOLARYNGOLOGY

## 2018-07-26 NOTE — MR AVS SNAPSHOT
After Visit Summary   7/26/2018    Vilma Barber    MRN: [id number]           Patient Information     Date Of Birth          1965        Visit Information        Provider Department      7/26/2018 8:00 AM Sameer Lin MD [facility]        Today's Diagnoses     Tinnitus of both ears    -  1    Drowsiness          Care Instructions    General Scheduling Information  To schedule your CT/MRI scan, please contact [place] [facility] at [phone] [address] [place] [address]    To schedule your Surgery, please contact our Specialty Schedulers at [phone]    ENT Clinic Locations Clinic Hours Telephone Number     [facility]  [address]  [place] [address]   Tuesday:       8:00am -- 4:00pm    Wednesday:  8:00am - 4:00pm   To schedule an appointment with   Dr. Lin,   please contact our   Specialty Scheduling Department at:     [phone]       [facility]  [address] [place] [address]   Friday:          8:00am - 4:00pm         Urgent Care Locations Clinic Hours Telephone Numbers     [facility] [place]  [address] [place] [address]     Monday-Friday:     11:00pm - 9:00pm    Saturday-Sunday:  9:00am - 5:00pm   [phone]     [facility]  [address] [place] [address]     Monday-Friday:      5:00pm - 9:00pm     Saturday-Sunday:  9:00am - 5:00pm   [phone]                   Follow-ups after your visit        Additional Services     AUDIOLOGY ADULT REFERRAL       Your provider has referred you to: [facility]: [facility] [phone]   [email or web address]    Treatment:  Evaluation & Treatment  Specialty Testing:  Audiogram w/Tymps and Reflexes    Please be aware that coverage of these services is subject to the terms and limitations of your health insurance plan.  Call member services at your health plan with any benefit or coverage questions.       Please bring the following to your appointment:  >>   Any x-rays, CTs or MRIs which have been performed.  Contact the facility where they were done to arrange for  prior to your scheduled appointment.   >>   List of current medications  >>   This referral request   >>   Any documents/labs given to you for this referral            SLEEP EVALUATION & MANAGEMENT REFERRAL - [facility]  [phone] (Age 15 and up)       Please be aware that coverage of these services is subject to the terms and limitations of your health insurance plan.  Call member services at your health plan with any benefit or coverage questions.      Please bring the following to your appointment:    >>   List of current medications   >>   This referral request   >>   Any documents/labs given to you for this referral                      Future tests that were ordered for you today     Open Future Orders        Priority Expected Expires Ordered    SLEEP EVALUATION & MANAGEMENT REFERRAL - [facility]  [phone] (Age 15 and up) Routine  7/26/2019 7/26/2018            Who to contact     If you have questions or need follow up information about today's clinic visit or your schedule please contact [facility] directly at [phone].  Normal or non-critical lab and imaging results will be communicated to you by MyChart, letter or phone within 4 business days after the clinic has received the results. If you do not hear from us within 7 days, please contact the clinic through MyChart or phone. If you have a critical or abnormal lab result, we will notify you by phone as soon as possible.  Submit refill requests through [employer] or call your pharmacy and they will forward the refill request to us. Please allow 3 business days for your refill to be completed.          Additional Information About Your Visit        [employer] Information     [employer] lets you send  "messages to your doctor, view your test results, renew your prescriptions, schedule appointments and more. To sign up, go to [email or web address] . Click on \"Log in\" on the left side of the screen, which will take you to the Welcome page. Then click on \"Sign up Now\" on the right side of the page.     You will be asked to enter the access code listed below, as well as some personal information. Please follow the directions to create your username and password.     Your access code is: [email or web address]  Expires: 10/10/2018  7:00 AM     Your access code will  in 90 days. If you need help or a new code, please call your [place] clinic or [phone].        Care EveryWhere ID     This is your Care EveryWhere ID. This could be used by other organizations to access your [place] medical records  [id number]        Your Vitals Were     Pulse Respirations Height Pulse Oximetry BMI (Body Mass Index)       73 16 1.562 m (5' 1.5\") 100% 27.07 kg/m2        Blood Pressure from Last 3 Encounters:   18 145/87   18 120/80   18 110/70    Weight from Last 3 Encounters:   18 66 kg (145 lb 9.6 oz)   18 64.3 kg (141 lb 12.8 oz)   18 65.2 kg (143 lb 12.8 oz)              We Performed the Following     AUDIOLOGY ADULT REFERRAL     CBC with platelets and differential     Comprehensive metabolic panel (BMP + Alb, Alk Phos, ALT, AST, Total. Bili, TP)     TSH with free T4 reflex        Primary Care Provider Office Phone # Fax #    Angela RODGER Hernandez [phone] [address] [facility] [place] [address]        Equal Access to Services     [facility] : Idalia Olsen, waaxda luqadaha, qaybta kaalmada rory, sam beach. So [facility] [phone].    ATENCIÓN: Si habla español, tiene a gr disposición servicios gratuitos de asistencia lingüística. Llame al [phone].    We comply with applicable federal civil rights laws " and [place] laws. We do not discriminate on the basis of race, color, national origin, age, disability, sex, sexual orientation, or gender identity.            Thank you!     Thank you for choosing [facility]  for your care. Our goal is always to provide you with excellent care. Hearing back from our patients is one way we can continue to improve our services. Please take a few minutes to complete the written survey that you may receive in the mail after your visit with us. Thank you!             Your Updated Medication List - Protect others around you: Learn how to safely use, store and throw away your medicines at www.disposemymeds.org.          This list is accurate as of 7/26/18  8:14 AM.  Always use your most recent med list.                   Brand Name Dispense Instructions for use Diagnosis    aspirin 81 MG tablet      Take 81 mg by mouth daily        cyclobenzaprine 10 MG tablet    FLEXERIL    90 tablet    Take 0.5 tablets (5 mg) by mouth nightly as needed for muscle spasms    Periodic headache syndrome, not intractable       hydrochlorothiazide 25 MG tablet    HYDRODIURIL    90 tablet    TAKE 1 TABLET (25 MG) BY MOUTH DAILY    Benign essential hypertension       isometheptene-dichloralphenazone-acetaminophen -325 MG per capsule    MIDRIN    30 capsule    Take 1 capsule by mouth every 4 hours as needed for headaches    Periodic headache syndrome, not intractable       LORazepam 0.5 MG tablet    ATIVAN    30 tablet    TAKE 1/2 TO 1 TABLET BY MOUTH EVERY 8 HOURS AS NEEDED FOR ANXIETY    Anxiety       ranitidine 150 MG tablet    ZANTAC    60 tablet    Take 1 tablet (150 mg) by mouth 2 times daily    Gastroesophageal reflux disease without esophagitis

## 2018-07-26 NOTE — LETTER
7/26/2018         RE: Vilma Barber  [address] [facility] [address]        Dear Colleague,    Thank you for referring your patient, Vilma Barber, to the [facility]. Please see a copy of my visit note below.    I am seeing this patient in consultation for dizziness and tinnitus at the request of the provider Angela Reilly.      Chief Complaint - Drowsiness, tinnitus    History of Present Illness - Vilma Barber is a 52 year old female who presents with drowsiness. Has episodes of 2-3 hours of drowsiness at a time. Sometimes happens a couple times per day. This has been going on for weeks to months. The patient has had nausea. She feels she sleeps 8 hours adequate. doesn't know about snoring. Denies apnea. The patient has noted some ear symptoms. + tinnitus, and mild otalgia, but no otorrhea, or hearing loss. The patient has tried nothing. Some noise exposure at work. Denies jeffy vertigo. She has some vision concerns and feels she strains at work.     Past Medical History -   Patient Active Problem List   Diagnosis     GERD (gastroesophageal reflux disease)     CARDIOVASCULAR SCREENING; LDL GOAL LESS THAN 160     Iron deficiency     Migraines     Gilbert's disease     Breast mass     Benign essential hypertension     Back pain     Family history of breast cancer in first degree relative     Neck pain     Anxiety     HTN (hypertension)   fibromyalgia    Current Medications -   Current Outpatient Prescriptions:      aspirin 81 MG tablet, Take 81 mg by mouth daily, Disp: , Rfl:      cyclobenzaprine (FLEXERIL) 10 MG tablet, Take 0.5 tablets (5 mg) by mouth nightly as needed for muscle spasms, Disp: 90 tablet, Rfl: 1     hydrochlorothiazide (HYDRODIURIL) 25 MG tablet, TAKE 1 TABLET (25 MG) BY MOUTH DAILY, Disp: 90 tablet, Rfl: 3     isometheptene-dichloralphenazone-acetaminophen (MIDRIN) -325 MG per capsule, Take 1 capsule by mouth every 4 hours as needed for  "headaches, Disp: 30 capsule, Rfl: 3     LORazepam (ATIVAN) 0.5 MG tablet, TAKE 1/2 TO 1 TABLET BY MOUTH EVERY 8 HOURS AS NEEDED FOR ANXIETY, Disp: 30 tablet, Rfl: 0     ranitidine (ZANTAC) 150 MG tablet, Take 1 tablet (150 mg) by mouth 2 times daily, Disp: 60 tablet, Rfl: 3    Allergies - No Known Allergies    Social History -   Social History     Social History     Marital status:      Spouse name: N/A     Number of children: 3     Years of education: N/A     Occupational History     Office work [employer]     Social History Main Topics     Smoking status: Never Smoker     Smokeless tobacco: Never Used      Comment: smoke free household.     Alcohol use Yes      Comment: occ     Drug use: No     Sexual activity: No     Other Topics Concern     Parent/Sibling W/ Cabg, Mi Or Angioplasty Before 65f 55m? No     Social History Narrative   Nonsmoker, nondrinker    Family History -   Family History   Problem Relation Age of Onset     HEART DISEASE Brother      Asthma Son      Hypertension Mother      HEART DISEASE Mother      Glaucoma No family hx of      Macular Degeneration No family hx of      Cancer No family hx of      Diabetes No family hx of      Thyroid Disease No family hx of      Anesthesia Reaction No family hx of        Review of Systems - As per HPI and PMHx,+ acid reflux otherwise 10+ comprehensive system review is negative.    Physical Exam  /87 (Cuff Size: Adult Regular)  Pulse 73  Resp 16  Ht 1.562 m (5' 1.5\")  Wt 66 kg (145 lb 9.6 oz)  SpO2 100%  BMI 27.07 kg/m2  General - The patient is in no distress.  Alert and oriented x3, answers questions and cooperates with examination appropriately.   Voice and Breathing - The patient was breathing comfortably without the use of accessory muscles. There was no wheezing, stridor, or stertor.  The patients voice was clear and strong, with no dysphonia.    Eyes - Pupils are reactive to light. Extraocular movements intact. Sclera were not icteric or " injected, conjunctiva were pink and moist. No nystagmus. However, on lateral gaze her eyes drift some.  Ears - The auricles appeared normal. The external auditory canals were nonedematous and nonerythematous. The tympanic membranes are normal in appearance, bony landmarks are intact.  No retraction, perforation, or masses.  No fluid or purulence was seen in the external canal or the middle ear.   Neurologic - Cranial nerves II-XII are grossly intact. Specifically, the facial nerve is intact, House-Brackmann grade 1 of 6. Facial sensation is intact and symmetric. Finger-nose-finger is normal. Normal Romberg. Normal gait.   Mouth - Examination of the oral cavity showed pink, healthy oral mucosa. No lesions or ulcerations noted.  The tongue was mobile and protrudes midline.   Oropharynx - The walls of the oropharynx were smooth, pink, moist, symmetric, and had no lesions or ulcerations. The uvula was midline and the palate raised symmetrically.   Neck -  Palpation of the occipital, submental, submandibular, internal jugular chain, and supraclavicular nodes did not demonstrate any abnormal lymph nodes or masses. The parotid glands were without masses. Palpation of the thyroid was soft and smooth, with no nodules or goiter appreciated.  The trachea was midline.  Cardiovascular - carotid pulses are 2+ bilaterally, regular rhythm      Audiologic Studies - An audiogram and tympanogram were performed today as part of the evaluation and personally reviewed. The tympanogram shows normal Type A curves, with normal canal volumes and middle ear pressures.  There is no sign of eustachian tube dysfunction or middle ear effusion.  The audiogram was also normal.       A/P - Vilma Barber is a 52 year old female with a few concerns.  Her chief complaint is drowsiness.  She seems to have some overlap with some dizziness symptoms.  However she seems to describe this as episodes of feeling very tired or sleepy or fatigued  especially at work and can last 2-3 hours.  She feels her eyes become strained somewhat and they may be problematic.  Because of this I want her to see her eye doctor again to make sure everything is okay.  I do not think this is Ménière's that she is not describing jeffy vertigo.  Her tinnitus is not associated with any sensorineural hearing loss and is only intermittent.  We discussed masking and reduction of stress and anxiety.  Lastly I will check some basic labs CBC, TSH, and CMP to rule out any other cause of her fatigue.  I will also refer her to the sleep medicine physicians to see if she has any sort of sleep apnea or other sleeping disorder causing daytime fatigue.         Sameer Lin MD  Otolaryngology  [facility]      Again, thank you for allowing me to participate in the care of your patient.        Sincerely,        Sameer Lin MD

## 2018-07-26 NOTE — MR AVS SNAPSHOT
"              After Visit Summary   7/26/2018    Vilma Barber    MRN: [id number]           Patient Information     Date Of Birth          1965        Visit Information        Provider Department      7/26/2018 7:30 AM Anthony Gustafson AuD [facility]        Today's Diagnoses     Tinnitus, bilateral    -  1       Follow-ups after your visit        Your next 10 appointments already scheduled     Jul 26, 2018  8:00 AM CDT   New Visit with Sameer Lin MD   [facility] ([facility])    [address] [place] [address] [phone]              Who to contact     If you have questions or need follow up information about today's clinic visit or your schedule please contact [facility] directly at [phone].  Normal or non-critical lab and imaging results will be communicated to you by MyChart, letter or phone within 4 business days after the clinic has received the results. If you do not hear from us within 7 days, please contact the clinic through MyChart or phone. If you have a critical or abnormal lab result, we will notify you by phone as soon as possible.  Submit refill requests through [employer] or call your pharmacy and they will forward the refill request to us. Please allow 3 business days for your refill to be completed.          Additional Information About Your Visit        MyChart Information     [employer] lets you send messages to your doctor, view your test results, renew your prescriptions, schedule appointments and more. To sign up, go to [email or web address] . Click on \"Log in\" on the left side of the screen, which will take you to the Welcome page. Then click on \"Sign up Now\" on the right side of the page.     You will be asked to enter the access code listed below, as well as some personal information. Please follow the directions to create your username and password.     Your access code is: " [email or web address]  Expires: 10/10/2018  7:00 AM     Your access code will  in 90 days. If you need help or a new code, please call your [place] clinic or [phone].        Care EveryWhere ID     This is your Care EveryWhere ID. This could be used by other organizations to access your [place] medical records  [id number]         Blood Pressure from Last 3 Encounters:   18 120/80   18 110/70   17 116/80    Weight from Last 3 Encounters:   18 141 lb 12.8 oz (64.3 kg)   18 143 lb 12.8 oz (65.2 kg)   17 135 lb 9.6 oz (61.5 kg)              We Performed the Following     AUDIOGRAM/TYMPANOGRAM - INTERFACE     COMPREHENSIVE HEARING TEST     TYMPANOMETRY        Primary Care Provider Office Phone # Fax #    Angela Hicks Erica Leung, APRN [facility] [phone]       [address] [facility] [address]        Equal Access to Services     [facility]: Hadii aad ku hadasho Soomaali, waaxda luqadaha, qaybta kaalmada adeegyada, waxay idiin hayverenice ceja . So [facility] [phone].    ATENCIÓN: Si habla español, tiene a gr disposición servicios gratuitos de asistencia lingüística. Llame al [phone].    We comply with applicable federal civil rights laws and [place] laws. We do not discriminate on the basis of race, color, national origin, age, disability, sex, sexual orientation, or gender identity.            Thank you!     Thank you for choosing [facility]  for your care. Our goal is always to provide you with excellent care. Hearing back from our patients is one way we can continue to improve our services. Please take a few minutes to complete the written survey that you may receive in the mail after your visit with us. Thank you!             Your Updated Medication List - Protect others around you: Learn how to safely use, store and throw away your medicines at www.disposemymeds.org.          This list is accurate as of 18  7:55 AM.   Always use your most recent med list.                   Brand Name Dispense Instructions for use Diagnosis    aspirin 81 MG tablet      Take 81 mg by mouth daily        cyclobenzaprine 10 MG tablet    FLEXERIL    90 tablet    Take 0.5 tablets (5 mg) by mouth nightly as needed for muscle spasms    Periodic headache syndrome, not intractable       hydrochlorothiazide 25 MG tablet    HYDRODIURIL    90 tablet    TAKE 1 TABLET (25 MG) BY MOUTH DAILY    Benign essential hypertension       isometheptene-dichloralphenazone-acetaminophen -325 MG per capsule    MIDRIN    30 capsule    Take 1 capsule by mouth every 4 hours as needed for headaches    Periodic headache syndrome, not intractable       LORazepam 0.5 MG tablet    ATIVAN    30 tablet    TAKE 1/2 TO 1 TABLET BY MOUTH EVERY 8 HOURS AS NEEDED FOR ANXIETY    Anxiety       ranitidine 150 MG tablet    ZANTAC    60 tablet    Take 1 tablet (150 mg) by mouth 2 times daily    Gastroesophageal reflux disease without esophagitis

## 2018-07-26 NOTE — PROGRESS NOTES
"AUDIOLOGY REPORT:    Patient was referred to Audiology from ENT by Dr. Lin for a hearing examination. Patient reports a longstanding intermittent bilateral tinnitus. Patient reports a recent onset of drowsiness and nausea. Patient reports the drowsiness happens most every day and makes her feel like \"I want to put my head down on a pillow\".     Testing:    Otoscopy:   Otoscopic exam indicates ears are clear of cerumen bilaterally     Tympanograms:    RIGHT: normal eardrum mobility     LEFT:   normal eardrum mobility    Thresholds:   Pure Tone Thresholds assessed using conventional audiometry with good  reliability from 250-8000 Hz bilaterally using TDH headphones     RIGHT:  normal hearing sensitivity for all frequencies tested.     LEFT:    normal hearing sensitivity for all frequencies tested.    NOTE: the patient became claustrophobic once the insert earphones were placed and the donnelly door was closed. Due to patient discomfort the donnelly door was left open and TDH-39 headphones were used.     Speech Reception Threshold:    RIGHT: 10 dB HL    LEFT:   10 dB HL    Word Recognition Score:     RIGHT: 100% at 50 dB HL using NU-6 recorded word list.    LEFT:   100% at 50 dB HL using NU-6 recorded word list.    Discussed results with the patient.     Patient was returned to ENT for follow up.     Anthony Haas CCC-A  Licensed Audiologist  7/26/2018                                            "

## 2018-07-26 NOTE — PATIENT INSTRUCTIONS
General Scheduling Information  To schedule your CT/MRI scan, please contact [place] [facility] at [phone]   [address] [place] [address]    To schedule your Surgery, please contact our Specialty Schedulers at [phone]    ENT Clinic Locations Clinic Hours Telephone Number     [facility]  [address] [place] [address]   Tuesday:       8:00am -- 4:00pm    Wednesday:  8:00am - 4:00pm   To schedule an appointment with   Dr. Lin,   please contact our   Specialty Scheduling Department at:     [phone]       [facility]  [address] [place] [address]   Friday:          8:00am - 4:00pm         Urgent Care Locations Clinic Hours Telephone Numbers     [facility]  [address] [place] [address]     Monday-Friday:     11:00pm - 9:00pm    Saturday-Sunday:  9:00am - 5:00pm   [phone]     [facility]  [address] [place] [address]     Monday-Friday:      5:00pm - 9:00pm     Saturday-Sunday:  9:00am - 5:00pm   [phone]

## 2018-07-26 NOTE — PROGRESS NOTES
I am seeing this patient in consultation for dizziness and tinnitus at the request of the provider Angela Reilly.      Chief Complaint - Drowsiness, tinnitus    History of Present Illness - Vilma Barber is a 52 year old female who presents with drowsiness. Has episodes of 2-3 hours of drowsiness at a time. Sometimes happens a couple times per day. This has been going on for weeks to months. The patient has had nausea. She feels she sleeps 8 hours adequate. doesn't know about snoring. Denies apnea. The patient has noted some ear symptoms. + tinnitus, and mild otalgia, but no otorrhea, or hearing loss. The patient has tried nothing. Some noise exposure at work. Denies jeffy vertigo. She has some vision concerns and feels she strains at work.     Past Medical History -   Patient Active Problem List   Diagnosis     GERD (gastroesophageal reflux disease)     CARDIOVASCULAR SCREENING; LDL GOAL LESS THAN 160     Iron deficiency     Migraines     Gilbert's disease     Breast mass     Benign essential hypertension     Back pain     Family history of breast cancer in first degree relative     Neck pain     Anxiety     HTN (hypertension)   fibromyalgia    Current Medications -   Current Outpatient Prescriptions:      aspirin 81 MG tablet, Take 81 mg by mouth daily, Disp: , Rfl:      cyclobenzaprine (FLEXERIL) 10 MG tablet, Take 0.5 tablets (5 mg) by mouth nightly as needed for muscle spasms, Disp: 90 tablet, Rfl: 1     hydrochlorothiazide (HYDRODIURIL) 25 MG tablet, TAKE 1 TABLET (25 MG) BY MOUTH DAILY, Disp: 90 tablet, Rfl: 3     isometheptene-dichloralphenazone-acetaminophen (MIDRIN) -325 MG per capsule, Take 1 capsule by mouth every 4 hours as needed for headaches, Disp: 30 capsule, Rfl: 3     LORazepam (ATIVAN) 0.5 MG tablet, TAKE 1/2 TO 1 TABLET BY MOUTH EVERY 8 HOURS AS NEEDED FOR ANXIETY, Disp: 30 tablet, Rfl: 0     ranitidine (ZANTAC) 150 MG tablet, Take 1 tablet (150 mg) by mouth 2 times daily, Disp:  "60 tablet, Rfl: 3    Allergies - No Known Allergies    Social History -   Social History     Social History     Marital status:      Spouse name: N/A     Number of children: 3     Years of education: N/A     Occupational History     Office work [employer]     Social History Main Topics     Smoking status: Never Smoker     Smokeless tobacco: Never Used      Comment: smoke free household.     Alcohol use Yes      Comment: occ     Drug use: No     Sexual activity: No     Other Topics Concern     Parent/Sibling W/ Cabg, Mi Or Angioplasty Before 65f 55m? No     Social History Narrative   Nonsmoker, nondrinker    Family History -   Family History   Problem Relation Age of Onset     HEART DISEASE Brother      Asthma Son      Hypertension Mother      HEART DISEASE Mother      Glaucoma No family hx of      Macular Degeneration No family hx of      Cancer No family hx of      Diabetes No family hx of      Thyroid Disease No family hx of      Anesthesia Reaction No family hx of        Review of Systems - As per HPI and PMHx,+ acid reflux otherwise 10+ comprehensive system review is negative.    Physical Exam  /87 (Cuff Size: Adult Regular)  Pulse 73  Resp 16  Ht 1.562 m (5' 1.5\")  Wt 66 kg (145 lb 9.6 oz)  SpO2 100%  BMI 27.07 kg/m2  General - The patient is in no distress.  Alert and oriented x3, answers questions and cooperates with examination appropriately.   Voice and Breathing - The patient was breathing comfortably without the use of accessory muscles. There was no wheezing, stridor, or stertor.  The patients voice was clear and strong, with no dysphonia.    Eyes - Pupils are reactive to light. Extraocular movements intact. Sclera were not icteric or injected, conjunctiva were pink and moist. No nystagmus. However, on lateral gaze her eyes drift some.  Ears - The auricles appeared normal. The external auditory canals were nonedematous and nonerythematous. The tympanic membranes are normal in appearance, " bony landmarks are intact.  No retraction, perforation, or masses.  No fluid or purulence was seen in the external canal or the middle ear.   Neurologic - Cranial nerves II-XII are grossly intact. Specifically, the facial nerve is intact, House-Brackmann grade 1 of 6. Facial sensation is intact and symmetric. Finger-nose-finger is normal. Normal Romberg. Normal gait.   Mouth - Examination of the oral cavity showed pink, healthy oral mucosa. No lesions or ulcerations noted.  The tongue was mobile and protrudes midline.   Oropharynx - The walls of the oropharynx were smooth, pink, moist, symmetric, and had no lesions or ulcerations. The uvula was midline and the palate raised symmetrically.   Neck -  Palpation of the occipital, submental, submandibular, internal jugular chain, and supraclavicular nodes did not demonstrate any abnormal lymph nodes or masses. The parotid glands were without masses. Palpation of the thyroid was soft and smooth, with no nodules or goiter appreciated.  The trachea was midline.  Cardiovascular - carotid pulses are 2+ bilaterally, regular rhythm      Audiologic Studies - An audiogram and tympanogram were performed today as part of the evaluation and personally reviewed. The tympanogram shows normal Type A curves, with normal canal volumes and middle ear pressures.  There is no sign of eustachian tube dysfunction or middle ear effusion.  The audiogram was also normal.       A/P - Vilma Barber is a 52 year old female with a few concerns.  Her chief complaint is drowsiness.  She seems to have some overlap with some dizziness symptoms.  However she seems to describe this as episodes of feeling very tired or sleepy or fatigued especially at work and can last 2-3 hours.  She feels her eyes become strained somewhat and they may be problematic.  Because of this I want her to see her eye doctor again to make sure everything is okay.  I do not think this is Ménière's that she is not describing  jeffy vertigo.  Her tinnitus is not associated with any sensorineural hearing loss and is only intermittent.  We discussed masking and reduction of stress and anxiety.  Lastly I will check some basic labs CBC, TSH, and CMP to rule out any other cause of her fatigue.  I will also refer her to the sleep medicine physicians to see if she has any sort of sleep apnea or other sleeping disorder causing daytime fatigue.         Sameer Lin MD  Otolaryngology  [facility]

## 2018-09-25 ENCOUNTER — OFFICE VISIT (OUTPATIENT)
Dept: OPHTHALMOLOGY | Facility: CLINIC | Age: 53
End: 2018-09-25
Payer: COMMERCIAL

## 2018-09-25 ENCOUNTER — APPOINTMENT (OUTPATIENT)
Dept: OPTOMETRY | Facility: CLINIC | Age: 53
End: 2018-09-25
Payer: COMMERCIAL

## 2018-09-25 DIAGNOSIS — H52.203 ASTIGMATISM OF BOTH EYES, UNSPECIFIED TYPE: ICD-10-CM

## 2018-09-25 DIAGNOSIS — Z01.01 ENCOUNTER FOR EXAMINATION OF EYES AND VISION WITH ABNORMAL FINDINGS: Primary | ICD-10-CM

## 2018-09-25 DIAGNOSIS — H52.4 PRESBYOPIA: ICD-10-CM

## 2018-09-25 DIAGNOSIS — H52.02 HYPERMETROPIA OF LEFT EYE: ICD-10-CM

## 2018-09-25 PROCEDURE — 92004 COMPRE OPH EXAM NEW PT 1/>: CPT | Performed by: STUDENT IN AN ORGANIZED HEALTH CARE EDUCATION/TRAINING PROGRAM

## 2018-09-25 PROCEDURE — 92015 DETERMINE REFRACTIVE STATE: CPT | Performed by: STUDENT IN AN ORGANIZED HEALTH CARE EDUCATION/TRAINING PROGRAM

## 2018-09-25 PROCEDURE — 92341 FIT SPECTACLES BIFOCAL: CPT | Performed by: STUDENT IN AN ORGANIZED HEALTH CARE EDUCATION/TRAINING PROGRAM

## 2018-09-25 ASSESSMENT — EXTERNAL EXAM - RIGHT EYE: OD_EXAM: NORMAL

## 2018-09-25 ASSESSMENT — CUP TO DISC RATIO
OD_RATIO: 0.1
OS_RATIO: 0.1

## 2018-09-25 ASSESSMENT — REFRACTION_WEARINGRX
OS_AXIS: 021
OD_CYLINDER: +1.25
OD_SPHERE: PLANO
OS_CYLINDER: +0.75
OD_ADD: +2.25
OS_SPHERE: +1.25
SPECS_TYPE: BIFOCAL
OS_ADD: +2.50
OD_AXIS: 017

## 2018-09-25 ASSESSMENT — REFRACTION_MANIFEST
OS_SPHERE: +1.00
OS_ADD: +2.50
OD_CYLINDER: +1.50
OS_CYLINDER: +1.25
OD_ADD: +2.50
OD_SPHERE: PLANO
OS_AXIS: 024
OD_AXIS: 012

## 2018-09-25 ASSESSMENT — VISUAL ACUITY
OS_CC: 1-
OD_CC: 20/20
OD_CC: 1-
CORRECTION_TYPE: GLASSES
OS_CC: 20/20
METHOD: SNELLEN - LINEAR

## 2018-09-25 ASSESSMENT — TONOMETRY
IOP_METHOD: APPLANATION
OS_IOP_MMHG: 14
OD_IOP_MMHG: 16

## 2018-09-25 ASSESSMENT — CONF VISUAL FIELD
OD_NORMAL: 1
OS_NORMAL: 1

## 2018-09-25 ASSESSMENT — SLIT LAMP EXAM - LIDS
COMMENTS: NORMAL
COMMENTS: NORMAL

## 2018-09-25 ASSESSMENT — EXTERNAL EXAM - LEFT EYE: OS_EXAM: NORMAL

## 2018-09-25 NOTE — MR AVS SNAPSHOT
After Visit Summary   9/25/2018    Vilma Barber    MRN: [id number]           Patient Information     Date Of Birth          1965        Visit Information        Provider Department      9/25/2018 8:00 AM Sushma Melara MD [facility]        Today's Diagnoses     Encounter for examination of eyes and vision with abnormal findings    -  1    Presbyopia        Hypermetropia of left eye        Astigmatism of both eyes, unspecified type          Care Instructions    Glasses prescription given - optional (can separate into distance and reading glasses if you prefer)    Sushma Melara MD  [phone]            Follow-ups after your visit        Follow-up notes from your care team     Return in about 1 year (around 9/25/2019) for Complete Exam.      Who to contact     If you have questions or need follow up information about today's clinic visit or your schedule please contact [facility] directly at [phone].  Normal or non-critical lab and imaging results will be communicated to you by MyChart, letter or phone within 4 business days after the clinic has received the results. If you do not hear from us within 7 days, please contact the clinic through MyChart or phone. If you have a critical or abnormal lab result, we will notify you by phone as soon as possible.  Submit refill requests through [employer] or call your pharmacy and they will forward the refill request to us. Please allow 3 business days for your refill to be completed.          Additional Information About Your Visit        Care EveryWhere ID     This is your Care EveryWhere ID. This could be used by other organizations to access your [facility] medical records  [id number]         Blood Pressure from Last 3 Encounters:   07/26/18 145/87   07/12/18 120/80   05/31/18 110/70    Weight from Last 3 Encounters:   07/26/18 66 kg (145 lb 9.6 oz)   07/12/18 64.3 kg (141 lb 12.8 oz)   05/31/18 65.2 kg  (143 lb 12.8 oz)              We Performed the Following     EYE EXAM (SIMPLE-NONBILLABLE)     REFRACTIVE STATUS        Primary Care Provider Office Phone # Fax #    RODGER Munguia [facility] [phone] [address]  [place] [address]        Equal Access to Services     [facility] CRYSTAL : Hadii aad ku hadasho Soomaali, waaxda luqadaha, qaybta kaalmada adeegyada, waxay idiin hayaan adeeg kharash la'aan . So [facility] [phone].    ATENCIÓN: Si habla español, tiene a gr disposición servicios gratuitos de asistencia lingüística. Llame al [phone].    We comply with applicable federal civil rights laws and [place] laws. We do not discriminate on the basis of race, color, national origin, age, disability, sex, sexual orientation, or gender identity.            Thank you!     Thank you for choosing [facility]  for your care. Our goal is always to provide you with excellent care. Hearing back from our patients is one way we can continue to improve our services. Please take a few minutes to complete the written survey that you may receive in the mail after your visit with us. Thank you!             Your Updated Medication List - Protect others around you: Learn how to safely use, store and throw away your medicines at www.disposemymeds.org.          This list is accurate as of 9/25/18  8:58 AM.  Always use your most recent med list.                   Brand Name Dispense Instructions for use Diagnosis    aspirin 81 MG tablet      Take 81 mg by mouth daily        cyclobenzaprine 10 MG tablet    FLEXERIL    90 tablet    Take 0.5 tablets (5 mg) by mouth nightly as needed for muscle spasms    Periodic headache syndrome, not intractable       hydrochlorothiazide 25 MG tablet    HYDRODIURIL    90 tablet    TAKE 1 TABLET (25 MG) BY MOUTH DAILY    Benign essential hypertension       isometheptene-dichloralphenazone-acetaminophen -325 MG per capsule    MIDRIN    30 capsule     Take 1 capsule by mouth every 4 hours as needed for headaches    Periodic headache syndrome, not intractable       LORazepam 0.5 MG tablet    ATIVAN    30 tablet    TAKE 1/2 TO 1 TABLET BY MOUTH EVERY 8 HOURS AS NEEDED FOR ANXIETY    Anxiety       ranitidine 150 MG tablet    ZANTAC    60 tablet    Take 1 tablet (150 mg) by mouth 2 times daily    Gastroesophageal reflux disease without esophagitis

## 2018-09-25 NOTE — PROGRESS NOTES
" Current Eye Medications:  None.      Subjective:  Comprehensive Eye Exam.  Patient complains of daily dizziness, drowsiness, and headache feeling around (and behind) both eyes, for the past month.  She complains of increased headache pain with eye movement.  Also some nausea with her headaches.  Her bifocal glasses cause increased dizziness feeling, so she does not wear them for driving.   Last eye exam:  July of 2017 with Dr. Nelson.       Objective:  See Ophthalmology Exam.       Assessment:  Vilma Barber is a 52 year old female who presents with:     Presbyopia        Plan:  Glasses prescription given - optional (can separate into distance and reading glasses if you prefer)  Use artificial tears up to 4 times per day (Refresh Plus, Systane Balance, or generic artificial tears are ok. Avoid \"get the red out\" drops).     Sushma Melara MD  [phone]      "

## 2018-09-25 NOTE — PATIENT INSTRUCTIONS
Glasses prescription given - optional (can separate into distance and reading glasses if you prefer)    Sushma Melara MD  [phone]

## 2018-09-25 NOTE — LETTER
"    9/25/2018         RE: Vilma Barber  [address] [facility] [address]        Dear Colleague,    Thank you for referring your patient, Vilma Barber, to the [facility].     Her eye exam is stable.  Please see a copy of my visit note below.     Current Eye Medications:  None.      Subjective:  Comprehensive Eye Exam.  Patient complains of daily dizziness, drowsiness, and headache feeling around (and behind) both eyes, for the past month.  She complains of increased headache pain with eye movement.  Also some nausea with her headaches.  Her bifocal glasses cause increased dizziness feeling, so she does not wear them for driving.   Last eye exam:  July of 2017 with Dr. Nelson.       Objective:  See Ophthalmology Exam.       Assessment:  Vilma Barber is a 52 year old female who presents with:     Presbyopia        Plan:  Glasses prescription given - optional (can separate into distance and reading glasses if you prefer)  Use artificial tears up to 4 times per day (Refresh Plus, Systane Balance, or generic artificial tears are ok. Avoid \"get the red out\" drops).     Sushma Melara MD  [phone]        Again, thank you for allowing me to participate in the care of your patient.        Sincerely,        Sushma Melara MD    "

## 2019-01-16 ENCOUNTER — OFFICE VISIT (OUTPATIENT)
Dept: OPTOMETRY | Facility: CLINIC | Age: 54
End: 2019-01-16

## 2019-01-16 DIAGNOSIS — T14.8XXA HEMATOMA OF SKIN: Primary | ICD-10-CM

## 2019-01-16 PROCEDURE — 99213 OFFICE O/P EST LOW 20 MIN: CPT | Performed by: OPTOMETRIST

## 2019-01-16 ASSESSMENT — EXTERNAL EXAM - RIGHT EYE: OD_EXAM: NORMAL

## 2019-01-16 ASSESSMENT — SLIT LAMP EXAM - LIDS
COMMENTS: NORMAL
COMMENTS: NORMAL

## 2019-01-16 ASSESSMENT — CONF VISUAL FIELD
OD_NORMAL: 1
OS_NORMAL: 1

## 2019-01-16 ASSESSMENT — VISUAL ACUITY
OD_SC: 20/25
METHOD: SNELLEN - LINEAR
OS_PH_SC: 20/20
OS_SC: 20/80

## 2019-01-16 ASSESSMENT — CUP TO DISC RATIO
OD_RATIO: 0.1
OS_RATIO: 0.1

## 2019-01-16 NOTE — PATIENT INSTRUCTIONS
The bruising around your left eye is likely due to a small break in a blood vessel of the skin. No hx of recent exertion, coughing/sneezing or trauma. Patient does use aspirin. The condition presented while patient was at work at her desk.   Begin use of cold compresses for 5-10 minutes at least 3 times daily over the left eye to help the bruising heal.   If no improvement within a week, or if the condition significantly worsens, can consider being seen by dermatology (potential drainage?).     Otherwise, the condition should heal on its own and the cold compresses should help it heal more quickly.   Health of the eye itself is within normal limits today.  Return for exam with Dr. Melara as recommended, or sooner if needed.       Tom Curry O.D.  [address] [place]  [address]    [phone]

## 2019-01-16 NOTE — LETTER
1/16/2019         RE: Vilma Barber  [address]  [place] [address]        Dear Colleague,    Thank you for referring your patient, Vilma Barber, to the [facility]. Please see a copy of my visit note below.    Chief Complaint   Patient presents with     Conjunctivitis       Do you wear contact lenses? No        Tom Curry, OD     See Review Of Systems   Eyes: periocular redness/bruising  Constitutional: No fevers, chills, or weight changes.    HPI performed by Optometrist  scribed by Liliana Casanova, Optometric Tech    Medical, surgical and family histories reviewed and updated 1/16/2019.         OBJECTIVE: See Ophthalmology exam    ASSESSMENT:    ICD-10-CM    1. Hematoma of skin - Left Eye T14.8XXA       PLAN:    Patient Instructions   The bruising around your left eye is likely due to a small break in a blood vessel of the skin. No hx of recent exertion, coughing/sneezing or trauma. Patient does use aspirin. The condition presented while patient was at work at her desk.   Begin use of cold compresses for 5-10 minutes at least 3 times daily over the left eye to help the bruising heal.   If no improvement within a week, or if the condition significantly worsens, can consider being seen by dermatology (potential drainage?).     Otherwise, the condition should heal on its own and the cold compresses should help it heal more quickly.   Health of the eye itself is within normal limits today.  Return for exam with Dr. Melara as recommended, or sooner if needed.       Tom Curry O.D.  [address] [place]  [address]    [phone]           Again, thank you for allowing me to participate in the care of your patient.        Sincerely,        Tom Curry, OD

## 2019-01-16 NOTE — PROGRESS NOTES
Chief Complaint   Patient presents with     Conjunctivitis       Do you wear contact lenses? No        Tom Curry, OD     See Review Of Systems   Eyes: periocular redness/bruising  Constitutional: No fevers, chills, or weight changes.    HPI performed by Optometrist  scribed by Liliana Casanova, Optometric Tech    Medical, surgical and family histories reviewed and updated 1/16/2019.         OBJECTIVE: See Ophthalmology exam    ASSESSMENT:    ICD-10-CM    1. Hematoma of skin - Left Eye T14.8XXA       PLAN:    Patient Instructions   The bruising around your left eye is likely due to a small break in a blood vessel of the skin. No hx of recent exertion, coughing/sneezing or trauma. Patient does use aspirin. The condition presented while patient was at work at her desk.   Begin use of cold compresses for 5-10 minutes at least 3 times daily over the left eye to help the bruising heal.   If no improvement within a week, or if the condition significantly worsens, can consider being seen by dermatology (potential drainage?).     Otherwise, the condition should heal on its own and the cold compresses should help it heal more quickly.   Health of the eye itself is within normal limits today.  Return for exam with Dr. Melara as recommended, or sooner if needed.       Tom Curry O.D.  [address] [place]  [address]    [phone]

## 2019-08-08 ENCOUNTER — TELEPHONE (OUTPATIENT)
Dept: FAMILY MEDICINE | Facility: CLINIC | Age: 54
End: 2019-08-08

## 2019-08-08 NOTE — TELEPHONE ENCOUNTER
Panel Management Review      Patient has the following on her problem list:     Hypertension   Last three blood pressure readings:  BP Readings from Last 3 Encounters:   07/26/18 145/87   07/12/18 120/80   05/31/18 110/70     Blood pressure: FAILED    HTN Guidelines:  Less than 140/90      Composite cancer screening  Chart review shows that this patient is due/due soon for the following None  Summary:    Patient is due/failing the following:   BP CHECK and PHQ9    Action needed:   Patient needs office visit for hypertension.    Type of outreach:    Sent letter.    Questions for provider review:    None                                                                                                                                    Luz Maria HENDRICKS CMA       Chart routed to none .

## 2019-08-08 NOTE — LETTER
August 8, 2019          Vilma Barber,  [address] [facility]  [place] [address]        Dear Vilma Barber      Monitoring and managing your preventative and chronic health conditions are very important to us. Our records indicate that you have not scheduled for Appointment with your provider for BP check which was recommended by Angela Reilly CNP.      If you have received your health care elsewhere, please call the clinic so the information can be documented in your chart.    Please call [phone] or message us through your [employer] account to schedule an appointment or provide information for your chart.     Feel free to contact us if you have any questions or concerns!    I look forward to seeing you and working with you on your health care needs.     Sincerely,       Your [facility] Team/myriam

## 2019-11-26 ENCOUNTER — TELEPHONE (OUTPATIENT)
Dept: FAMILY MEDICINE | Facility: CLINIC | Age: 54
End: 2019-11-26

## 2019-11-26 NOTE — TELEPHONE ENCOUNTER
Panel Management Review      Patient has the following on her problem list:     Hypertension   Last three blood pressure readings:  BP Readings from Last 3 Encounters:   07/26/18 145/87   07/12/18 120/80   05/31/18 110/70     Blood pressure: FAILED    HTN Guidelines:  Less than 140/90      Composite cancer screening  Chart review shows that this patient is due/due soon for the following None  Summary:    Patient is due/failing the following:   BP CHECK and PHQ9    Action needed:   Patient needs office visit for hypertension .    Type of outreach:    Sent letter.    Questions for provider review:    None                                                                                                                                    Luz Maria HENDRICKS CMA       Chart routed to none   .

## 2019-11-26 NOTE — LETTER
November 26, 2019          Vilma Barber,  [address] [facility]  [place] [address]        Dear Vilma Barber      Monitoring and managing your preventative and chronic health conditions are very important to us. Our records indicate that you have not scheduled for Appointment with your provider  which was recommended by Angela Reilly CNP.        If you have received your health care elsewhere, please call the clinic so the information can be documented in your chart.    Please call [phone] or message us through your [employer] account to schedule an appointment or provide information for your chart.     Feel free to contact us if you have any questions or concerns!    I look forward to seeing you and working with you on your health care needs.     Sincerely,       Your [facility] Team/myriam

## 2019-12-06 ENCOUNTER — OFFICE VISIT (OUTPATIENT)
Dept: FAMILY MEDICINE | Facility: CLINIC | Age: 54
End: 2019-12-06
Payer: COMMERCIAL

## 2019-12-06 VITALS
TEMPERATURE: 97.9 F | HEART RATE: 66 BPM | BODY MASS INDEX: 27 KG/M2 | OXYGEN SATURATION: 98 % | WEIGHT: 143 LBS | DIASTOLIC BLOOD PRESSURE: 60 MMHG | SYSTOLIC BLOOD PRESSURE: 120 MMHG | HEIGHT: 61 IN | RESPIRATION RATE: 20 BRPM

## 2019-12-06 DIAGNOSIS — F41.8 DEPRESSION WITH ANXIETY: ICD-10-CM

## 2019-12-06 DIAGNOSIS — R10.84 ABDOMINAL PAIN, GENERALIZED: ICD-10-CM

## 2019-12-06 DIAGNOSIS — Z00.00 ROUTINE GENERAL MEDICAL EXAMINATION AT A HEALTH CARE FACILITY: Primary | ICD-10-CM

## 2019-12-06 DIAGNOSIS — N39.3 STRESS INCONTINENCE IN FEMALE: ICD-10-CM

## 2019-12-06 LAB
CHOLEST SERPL-MCNC: 229 MG/DL
GLUCOSE SERPL-MCNC: 97 MG/DL (ref 70–99)
HDLC SERPL-MCNC: 52 MG/DL
LDLC SERPL CALC-MCNC: 143 MG/DL
NONHDLC SERPL-MCNC: 177 MG/DL
SPECIMEN SOURCE: NORMAL
TRIGL SERPL-MCNC: 172 MG/DL
WET PREP SPEC: NORMAL

## 2019-12-06 PROCEDURE — 87591 N.GONORRHOEAE DNA AMP PROB: CPT | Performed by: PHYSICIAN ASSISTANT

## 2019-12-06 PROCEDURE — 36415 COLL VENOUS BLD VENIPUNCTURE: CPT | Performed by: PHYSICIAN ASSISTANT

## 2019-12-06 PROCEDURE — 80061 LIPID PANEL: CPT | Performed by: PHYSICIAN ASSISTANT

## 2019-12-06 PROCEDURE — 87491 CHLMYD TRACH DNA AMP PROBE: CPT | Performed by: PHYSICIAN ASSISTANT

## 2019-12-06 PROCEDURE — 82947 ASSAY GLUCOSE BLOOD QUANT: CPT | Performed by: PHYSICIAN ASSISTANT

## 2019-12-06 PROCEDURE — 87210 SMEAR WET MOUNT SALINE/INK: CPT | Performed by: PHYSICIAN ASSISTANT

## 2019-12-06 PROCEDURE — 99396 PREV VISIT EST AGE 40-64: CPT | Performed by: PHYSICIAN ASSISTANT

## 2019-12-06 RX ORDER — MECOBALAMIN 5000 MCG
5 TABLET,DISINTEGRATING ORAL AT BEDTIME
COMMUNITY
Start: 2018-03-09

## 2019-12-06 RX ORDER — IBUPROFEN 200 MG
1 CAPSULE ORAL DAILY
COMMUNITY
End: 2020-12-14

## 2019-12-06 RX ORDER — MAGNESIUM GLUCONATE 30 MG(550)
TABLET ORAL DAILY
COMMUNITY
Start: 2019-04-05

## 2019-12-06 RX ORDER — LISINOPRIL 10 MG/1
10 TABLET ORAL DAILY
COMMUNITY
Start: 2019-10-25 | End: 2019-12-06 | Stop reason: SINTOL

## 2019-12-06 ASSESSMENT — ANXIETY QUESTIONNAIRES
5. BEING SO RESTLESS THAT IT IS HARD TO SIT STILL: NOT AT ALL
GAD7 TOTAL SCORE: 5
2. NOT BEING ABLE TO STOP OR CONTROL WORRYING: NOT AT ALL
6. BECOMING EASILY ANNOYED OR IRRITABLE: SEVERAL DAYS
1. FEELING NERVOUS, ANXIOUS, OR ON EDGE: SEVERAL DAYS
3. WORRYING TOO MUCH ABOUT DIFFERENT THINGS: SEVERAL DAYS
7. FEELING AFRAID AS IF SOMETHING AWFUL MIGHT HAPPEN: SEVERAL DAYS

## 2019-12-06 ASSESSMENT — MIFFLIN-ST. JEOR: SCORE: 1198.89

## 2019-12-06 ASSESSMENT — PATIENT HEALTH QUESTIONNAIRE - PHQ9
SUM OF ALL RESPONSES TO PHQ QUESTIONS 1-9: 3
5. POOR APPETITE OR OVEREATING: SEVERAL DAYS

## 2019-12-06 NOTE — PATIENT INSTRUCTIONS
Preventive Health Recommendations  Female Ages 50 - 64    Yearly exam: See your health care provider every year in order to  o Review health changes.   o Discuss preventive care.    o Review your medicines if your doctor has prescribed any.      Get a Pap test every three years (unless you have an abnormal result and your provider advises testing more often).    If you get Pap tests with HPV test, you only need to test every 5 years, unless you have an abnormal result.     You do not need a Pap test if your uterus was removed (hysterectomy) and you have not had cancer.    You should be tested each year for STDs (sexually transmitted diseases) if you're at risk.     Have a mammogram every 1 to 2 years.    Have a colonoscopy at age 50, or have a yearly FIT test (stool test). These exams screen for colon cancer.      Have a cholesterol test every 5 years, or more often if advised.    Have a diabetes test (fasting glucose) every three years. If you are at risk for diabetes, you should have this test more often.     If you are at risk for osteoporosis (brittle bone disease), think about having a bone density scan (DEXA).    Shots: Get a flu shot each year. Get a tetanus shot every 10 years.    Nutrition:     Eat at least 5 servings of fruits and vegetables each day.    Eat whole-grain bread, whole-wheat pasta and brown rice instead of white grains and rice.    Get adequate Calcium and Vitamin D.     Lifestyle    Exercise at least 150 minutes a week (30 minutes a day, 5 days a week). This will help you control your weight and prevent disease.    Limit alcohol to one drink per day.    No smoking.     Wear sunscreen to prevent skin cancer.     See your dentist every six months for an exam and cleaning.    See your eye doctor every 1 to 2 years.    Patient Education     Your Body s Response to Anxiety    Normal anxiety is part of the body s natural defense system. It's an alert to a threat that is unknown, vague, or comes  from your own internal fears. While you re in this state, your feelings can range from a vague sense of worry to physical sensations such as a pounding heartbeat. These feelings make you want to react to the threat. An anxiety response is normal in many situations. But when you have an anxiety disorder, the same response can occur at the wrong times.  Anxiety can be helpful  Normal anxiety is a signal from your brain that warns you of a threat and is a normal response to help you prevent something or decrease the bad effects of something you can't control. For example, anxiety is a normal response to situations that might damage your body, separate you from a loved one, or lose your job. The symptoms of anxiety can be physical and mental.  How does it feel?  At certain times, people with anxiety may have:    Dizziness    Muscle tension or pain    Restlessness    Sleeplessness    Trouble concentrating    Racing heartbeat    Fast breathing    Shaking or trembling    Stomachache    Diarrhea    Loss of energy    Sweating    Cold, clammy hands    Chest pain    Dry mouth  Anxiety can also be a problem  Anxiety can become a problem when it is hard to control, occurs for months, and interferes with important parts of your life. With an anxiety disorder, your body has the response described above, but in inappropriate ways. The response a person has depends on the anxiety disorder he or she has. With some disorders, the anxiety is way out of proportion to the threat that triggers it. With others, anxiety may occur even when there isn t a clear threat or trigger.  Who does it affect?  Some people are more prone to persistent anxiety than others. It tends to run in families, and it affects more younger people than older people, and more women than men. But no age, race, or gender is immune to anxiety problems.  Anxiety can be treated  The good news is that the anxiety that s disrupting your life can be treated. Check with your  "healthcare provider and rule out any physical problems that may be causing the anxiety symptoms. If an anxiety disorder is diagnosed seek mental healthcare. This is an illness and it can respond to treatment. Most types of anxiety disorders will respond to \"talk therapy\" and medicines. Working with your doctor or other healthcare provider, you can develop skills to help you cope with anxiety. You can also gain the perspective you need to overcome your fears. Note: Good sources of support or guidance can be found at your local hospital, mental health clinic, or an employee assistance program.  How to cope with anxiety  If anxiety is wearing you down, here are some things you can do to cope:    Keep in mind that you can t control everything about a situation. Change what you can and let the rest take its course.    Exercise--it s a great way to relieve tension and help your body feel relaxed.    Avoid caffeine and nicotine, which can make anxiety symptoms worse.    Fight the temptation to turn to alcohol or unprescribed drugs for relief. They only make things worse in the long run.    Educate yourself about anxiety disorders. Keep track of helpful online resources and books you can use during stressful periods.    Try stress management techniques such as meditation.    Consider online or in-person support groups.   Date Last Reviewed: 1/1/2017 2000-2018 [employer]. [address] [place] [address], [place] [address]. All rights reserved. This information is not intended as a substitute for professional medical care. Always follow your healthcare professional's instructions.           "

## 2019-12-06 NOTE — PROGRESS NOTES
SUBJECTIVE:   CC: Vilma Barber is an 54 year old woman who presents for preventive health visit.     Healthy Habits:    Do you get at least three servings of calcium containing foods daily (dairy, green leafy vegetables, etc.)? yes    Amount of exercise or daily activities, outside of work: none    Problems taking medications regularly No    Medication side effects: No    Have you had an eye exam in the past two years? yes    Do you see a dentist twice per year? yes    Do you have sleep apnea, excessive snoring or daytime drowsiness?yes have daytime drowsiness      -------------------------------------    Today's PHQ-2 Score:   PHQ-2 ( 1999 [employer]) 12/6/2019 3/17/2017   Q1: Little interest or pleasure in doing things 0 0   Q2: Feeling down, depressed or hopeless 0 1   PHQ-2 Score 0 1       Abuse: Current or Past(Physical, Sexual or Emotional)- No  Do you feel safe in your environment? Yes        Social History     Tobacco Use     Smoking status: Never Smoker     Smokeless tobacco: Never Used     Tobacco comment: smoke free household.   Substance Use Topics     Alcohol use: Yes     Comment: occ     If you drink alcohol do you typically have >3 drinks per day or >7 drinks per week? No                     Reviewed orders with patient.  Reviewed health maintenance and updated orders accordingly - Yes      Pertinent mammograms are reviewed under the imaging tab.  History of abnormal Pap smear: NO - age 30-65 PAP every 5 years with negative HPV co-testing recommended  PAP / HPV 6/28/2013   PAP NIL     Reviewed and updated as needed this visit by clinical staff  Tobacco  Allergies  Meds  Med Hx  Surg Hx  Fam Hx  Soc Hx            ROS:  CONSTITUTIONAL: NEGATIVE for fever, chills, change in weight  INTEGUMENTARY/SKIN: NEGATIVE for worrisome rashes, moles or lesions  EYES: NEGATIVE for vision changes or irritation  ENT: NEGATIVE for ear, mouth and throat problems  RESP: NEGATIVE for significant cough or  "SOB  BREAST: NEGATIVE for masses, tenderness or discharge  CV: NEGATIVE for chest pain, palpitations or peripheral edema  GI: NEGATIVE for nausea, abdominal pain, heartburn, or change in bowel habits  : NEGATIVE for unusual urinary or vaginal symptoms. No vaginal bleeding.  MUSCULOSKELETAL: NEGATIVE for significant arthralgias or myalgia  NEURO: NEGATIVE for weakness, dizziness or paresthesias  PSYCHIATRIC: NEGATIVE for changes in mood or affect     OBJECTIVE:   /60   Pulse 66   Temp 97.9  F (36.6  C) (Oral)   Resp 20   Ht 1.562 m (5' 1.5\")   Wt 64.9 kg (143 lb)   SpO2 98%   BMI 26.59 kg/m    EXAM:  GENERAL: healthy, alert and no distress  EYES: Eyes grossly normal to inspection, PERRL and conjunctivae and sclerae normal  HENT: ear canals and TM's normal, nose and mouth without ulcers or lesions  NECK: no adenopathy, no asymmetry, masses, or scars and thyroid normal to palpation  RESP: lungs clear to auscultation - no rales, rhonchi or wheezes  BREAST: normal without masses, tenderness or nipple discharge and no palpable axillary masses or adenopathy  CV: regular rate and rhythm, normal S1 S2, no S3 or S4, no murmur, click or rub, no peripheral edema and peripheral pulses strong  ABDOMEN: soft, nontender, no hepatosplenomegaly, no masses and bowel sounds normal  MS: no gross musculoskeletal defects noted, no edema  SKIN: no suspicious lesions or rashes  NEURO: Normal strength and tone, mentation intact and speech normal  PSYCH: mentation appears normal, affect normal/bright    Diagnostic Test Results:  Labs reviewed in Epic    ASSESSMENT/PLAN:   1. Routine general medical examination at a health care facility  - Lipid panel reflex to direct LDL Fasting  - Glucose  - Wet prep    2. Stress incontinence in female  - PHYSICAL THERAPY REFERRAL; Future    3. Abdominal pain, generalized  - NEISSERIA GONORRHOEA PCR  - CHLAMYDIA TRACHOMATIS PCR    COUNSELING:   Reviewed preventive health counseling, as " "reflected in patient instructions    Estimated body mass index is 26.59 kg/m  as calculated from the following:    Height as of this encounter: 1.562 m (5' 1.5\").    Weight as of this encounter: 64.9 kg (143 lb).         reports that she has never smoked. She has never used smokeless tobacco.     Follow up on labs  MICHAELA 7  BP checks 2-3 per week  Follow up 4-6 weeks        Counseling Resources:  ATP IV Guidelines  Pooled Cohorts Equation Calculator  Breast Cancer Risk Calculator  FRAX Risk Assessment  ICSI Preventive Guidelines  Dietary Guidelines for Americans, 2010  USDA's MyPlate  ASA Prophylaxis  Lung CA Screening    Donald Neal PA-C  [facility]  "

## 2019-12-07 LAB
C TRACH DNA SPEC QL NAA+PROBE: NEGATIVE
N GONORRHOEA DNA SPEC QL NAA+PROBE: NEGATIVE
SPECIMEN SOURCE: NORMAL
SPECIMEN SOURCE: NORMAL

## 2019-12-07 ASSESSMENT — ANXIETY QUESTIONNAIRES: GAD7 TOTAL SCORE: 5

## 2019-12-13 ENCOUNTER — TELEPHONE (OUTPATIENT)
Dept: FAMILY MEDICINE | Facility: CLINIC | Age: 54
End: 2019-12-13

## 2019-12-13 DIAGNOSIS — F41.9 ANXIETY: Primary | ICD-10-CM

## 2019-12-13 RX ORDER — HYDROXYZINE HYDROCHLORIDE 25 MG/1
25 TABLET, FILM COATED ORAL EVERY 8 HOURS PRN
Qty: 24 TABLET | Refills: 1 | Status: SHIPPED | OUTPATIENT
Start: 2019-12-13 | End: 2021-05-14

## 2019-12-13 NOTE — TELEPHONE ENCOUNTER
New script sent for anxiety.  Follow up in 1 month and bring BP readings.  Dose adjustments then as appropriate.  Etta BERNAL

## 2019-12-13 NOTE — TELEPHONE ENCOUNTER
Donald-  Spoke with patient. She is wondering if she needs another prescription for BP or an increased dose of hydrochlorothiazide? She states that she was also under the impression that she was going to get an alternative prescription for anxiety from [facility]. Please advise.  She states that the BP readings 149/99, 138/88 were taken last night and this morning. Night before was high as well. She has been checking her blood pressure almost daily. She will start writing down the readings and keep a log.  Pt feels stressed out and angry. She is having symptoms of racing heart and feeling like she is tired.  [facility] provider gave her a refill of hydrochlorothiazide 25 mg daily in the summer for a year. She stopped lisinopril d/t side effects- coughing for about 6 months.  Pt has a prescription at home for lorazepam, she isn't taking because it makes her tired at work. She was under the understanding that she was going to do a different Rx for anxiety.

## 2019-12-13 NOTE — TELEPHONE ENCOUNTER
Reason for Call:  Other call back and prescription    Detailed comments: Pt was taking off medication Lisinopril, pt's blood pressure has been high everyday and she has been stressed out. Pt states she was told she was supposed to get prescribed new anxiety pills. So pt is following up on that.     149/99  138/88      Phone Number Patient can be reached at: Cell number on file:    Telephone Information:   Mobile [phone]       Best Time: any    Can we leave a detailed message on this number? YES    Call taken on 12/13/2019 at 11:16 AM by Anthony Hays

## 2019-12-13 NOTE — TELEPHONE ENCOUNTER
Called patient and notified her of provider message as written. Pt verbalized understanding. She was wondering if the hydroxyzine has a side effect of drowsiness. Writer reviewed up-to-date and notified pt at drowsiness is a potential side effect. For her first time taking, she will try it on her day off or in the evening before going to bed.

## 2020-01-02 ENCOUNTER — TELEPHONE (OUTPATIENT)
Dept: FAMILY MEDICINE | Facility: CLINIC | Age: 55
End: 2020-01-02

## 2020-01-02 NOTE — TELEPHONE ENCOUNTER
Please abstract the following data from this visit with this patient into the appropriate field in Epic:    Tests that can be patient reported without a hard copy:      Other Tests found in the patient's chart through Chart Review/Care Everywhere:    Pap smear done by this group [facility] this date: 7/16/18 and HPV done by this group [facility] on this date: 7/16/18    Note to [employer]: If this section is blank, no results were found via Chart Review/Care Everywhere.

## 2020-01-16 ENCOUNTER — OFFICE VISIT (OUTPATIENT)
Dept: FAMILY MEDICINE | Facility: CLINIC | Age: 55
End: 2020-01-16
Payer: COMMERCIAL

## 2020-01-16 VITALS
SYSTOLIC BLOOD PRESSURE: 110 MMHG | WEIGHT: 144.4 LBS | BODY MASS INDEX: 26.57 KG/M2 | RESPIRATION RATE: 16 BRPM | HEART RATE: 59 BPM | TEMPERATURE: 97.4 F | DIASTOLIC BLOOD PRESSURE: 60 MMHG | HEIGHT: 62 IN | OXYGEN SATURATION: 100 %

## 2020-01-16 DIAGNOSIS — I10 HTN, GOAL BELOW 140/90: Primary | ICD-10-CM

## 2020-01-16 DIAGNOSIS — E01.0 THYROMEGALY: ICD-10-CM

## 2020-01-16 LAB
ANION GAP SERPL CALCULATED.3IONS-SCNC: 1 MMOL/L (ref 3–14)
BUN SERPL-MCNC: 14 MG/DL (ref 7–30)
CALCIUM SERPL-MCNC: 9.7 MG/DL (ref 8.5–10.1)
CHLORIDE SERPL-SCNC: 102 MMOL/L (ref 94–109)
CO2 SERPL-SCNC: 36 MMOL/L (ref 20–32)
CREAT SERPL-MCNC: 0.66 MG/DL (ref 0.52–1.04)
GFR SERPL CREATININE-BSD FRML MDRD: >90 ML/MIN/{1.73_M2}
GLUCOSE SERPL-MCNC: 98 MG/DL (ref 70–99)
POTASSIUM SERPL-SCNC: 3.8 MMOL/L (ref 3.4–5.3)
SODIUM SERPL-SCNC: 139 MMOL/L (ref 133–144)
TSH SERPL DL<=0.005 MIU/L-ACNC: 1.54 MU/L (ref 0.4–4)

## 2020-01-16 PROCEDURE — 80048 BASIC METABOLIC PNL TOTAL CA: CPT | Performed by: PHYSICIAN ASSISTANT

## 2020-01-16 PROCEDURE — 99214 OFFICE O/P EST MOD 30 MIN: CPT | Performed by: PHYSICIAN ASSISTANT

## 2020-01-16 PROCEDURE — 84443 ASSAY THYROID STIM HORMONE: CPT | Performed by: PHYSICIAN ASSISTANT

## 2020-01-16 PROCEDURE — 36415 COLL VENOUS BLD VENIPUNCTURE: CPT | Performed by: PHYSICIAN ASSISTANT

## 2020-01-16 RX ORDER — CHLORTHALIDONE 25 MG/1
25 TABLET ORAL DAILY
Qty: 90 TABLET | Refills: 1 | Status: SHIPPED | OUTPATIENT
Start: 2020-01-16 | End: 2020-08-28

## 2020-01-16 ASSESSMENT — MIFFLIN-ST. JEOR: SCORE: 1206.49

## 2020-01-16 NOTE — PROGRESS NOTES
"Subjective     Vilma Barber is a 54 year old female who presents to clinic today for the following health issues:    HPI   Hypertension Follow-up      Do you check your blood pressure regularly outside of the clinic? Yes -check bp every night at home    Are you following a low salt diet? Yes    Are your blood pressures ever more than 140 on the top number (systolic) OR more   than 90 on the bottom number (diastolic), for example 140/90? Yes      How many servings of fruits and vegetables do you eat daily?  4 or more    On average, how many sweetened beverages do you drink each day (Examples: soda, juice, sweet tea, etc.  Do NOT count diet or artificially sweetened beverages)?   0    How many days per week do you exercise enough to make your heart beat faster? None    How many minutes a day do you exercise enough to make your heart beat faster? None    How many days per week do you miss taking your medication? 0      Review of Systems   ROS COMP: Constitutional, HEENT, cardiovascular, pulmonary, gi and gu systems are negative, except as otherwise noted.      Objective    /60   Pulse 59   Temp 97.4  F (36.3  C) (Oral)   Resp 16   Ht 1.564 m (5' 1.58\")   Wt 65.5 kg (144 lb 6.4 oz)   SpO2 100%   BMI 26.78 kg/m    Body mass index is 26.78 kg/m .     Physical Exam   GENERAL: healthy, alert and no distress  NECK: no adenopathy and thyromegaly approximately 2 times normal  RESP: lungs clear to auscultation - no rales, rhonchi or wheezes  CV: regular rate and rhythm, normal S1 S2, no S3 or S4, no murmur, click or rub, no peripheral edema and peripheral pulses strong  MS: no gross musculoskeletal defects noted, no edema    Diagnostic Test Results:  none         Assessment & Plan     1. HTN, goal below 140/90  - BASIC METABOLIC PANEL  - chlorthalidone (HYGROTON) 25 MG tablet; Take 1 tablet (25 mg) by mouth daily  Dispense: 90 tablet; Refill: 1    2. Thyromegaly  - TSH with free T4 reflex     Use medication " as directed.  Add MVI with minerals with Vit D daily with a meal.  Follow up on labs  Follow up in 2-4 months  CPE this year.     Donald Neal PA-C  [facility]

## 2020-01-22 ENCOUNTER — THERAPY VISIT (OUTPATIENT)
Dept: PHYSICAL THERAPY | Facility: CLINIC | Age: 55
End: 2020-01-22
Attending: PHYSICIAN ASSISTANT
Payer: COMMERCIAL

## 2020-01-22 DIAGNOSIS — N39.3 STRESS INCONTINENCE IN FEMALE: ICD-10-CM

## 2020-01-22 DIAGNOSIS — R32 INCONTINENCE IN FEMALE: ICD-10-CM

## 2020-01-22 DIAGNOSIS — R39.15 URINARY URGENCY: Primary | ICD-10-CM

## 2020-01-22 PROCEDURE — 97535 SELF CARE MNGMENT TRAINING: CPT | Mod: GP | Performed by: PHYSICAL THERAPIST

## 2020-01-22 PROCEDURE — 97110 THERAPEUTIC EXERCISES: CPT | Mod: GP | Performed by: PHYSICAL THERAPIST

## 2020-01-22 PROCEDURE — 97140 MANUAL THERAPY 1/> REGIONS: CPT | Mod: GP | Performed by: PHYSICAL THERAPIST

## 2020-01-22 PROCEDURE — 97162 PT EVAL MOD COMPLEX 30 MIN: CPT | Mod: GP | Performed by: PHYSICAL THERAPIST

## 2020-01-22 NOTE — LETTER
SATURN[facility] ESCAMILLA PT  [address]  [address]  [place] [address]  [phone]    2020    Re: Vilma Barber   :   1965  MRN:  [id number]   REFERRING PHYSICIAN:   DOLORES Pereira PT  Date of Initial Evaluation:  2020  Visits:  Rxs Used: 1  Reason for Referral:     Stress incontinence in female  Incontinence in female  Urinary urgency    EVALUATION SUMMARY    [facility] Initial Evaluation  Subjective:       The history is provided by the patient. No  was used.   Vilma Barber being seen for bladder leaking & urgency.   Problem began 10/1/2019. Where condition occurred: at home, at work, during recreation / sport and in the community.Problem occurred: insidious   and reported as 6/10 on pain scale. General health as reported by patient is good. Pertinent medical history includes:  High blood pressure, fibromyalgia and menopausal. Other medical history details: hot flashes.   Other surgery history details: tummy tuck.  Current medications:  High blood pressure medication.   Primary job tasks include:  Computer work, repetitive tasks, pushing/pulling, prolonged sitting and prolonged standing.  Pain is described as aching, burning, cramping, sharp and stabbing and is intermittent. Pain is worse during the night (during the day is distracted with work etc). Since onset symptoms are unchanged.   Patient is [employer]. Restrictions include:  Working in normal job without restrictions.    Barriers include:  None as reported by patient.  Red flags:  None as reported by patient.  Type of problem:  Incontinence and pelvic dysfunction  Problem details: Urgency every 30 min can postphone but symptoms gets worse  Reports no constipation.   Patient reports pain:  SI joint left.  Associated symptoms:  Tingling. Symptoms are exacerbated by intercourse, coughing, jumping and sneezing and relieved by other (voids may only be  a few drops).            Patient reports onset of symptoms beginning of OCT 2109.Symptoms include urgency and urinary incontinence.  Since onset symptoms have been getting worse     Urination:  Do you leak on the way to the bathroom or with a strong urge to void? Yes    Do you leak with cough,sneeze, jumping, running?Yes   Any other activities that cause leaking? Yes   Do you have triggers that make you feel you can't wait to go to the bathroom? Yes     Re: Vilma Barber   :   1965    what are they key in door .  Type of pad and number used per day? incont  When you leak what is the amount? Small amount    How long can you delay the need to urinate? 3 - 10 minutes. Depend pain increases  How many times do you get up to urinate at night? 1 time was 2-3 before.    Can you stop the flow of urine when on the toilet? sometimes  Is the volume of urine passed usually: small. (8 sec rule=  250ml with average bladder storing  400-600ml)    Do you strain to pass urine? sometimes  Do you have a slow or hesitant urinary stream? No  Do you have difficulty initiating the urine stream? sometimes  Is urination painful?  No    How many bladder infections have you had in last 12 months?1    Fluid intake(one glass is 8 oz or one cup) 3 glasses/day, 1 caffinated glasses/week  0 alcohol glasses/day.    Bowel habits:  Frequency of bowel movements? 7 times a week  Consistency of stool? soft, [place] Stool Scale 3-4  Do you ignore the urge to defecate? Yes  Do you strain to pass stool? No     Pelvic Pain:  Do you have any pelvic pain with intercourse, exams, use of tampons? No  Is initial penetration during intercourse painful? No  Is deeper penetration painful? Yes  Do you use lubricant?   Yes What kind? Doesn't know    Given birth? Yes Any complications? Yes  # of vaginal deliveries? 2 pg with twins has 3 children  # of C-sections 0  # of episiotomies?0.  Are you sexually active?Yes  Have you ever been worried for your  physical safety? no  Any abdominal or pelvic surgeries? yes Extra skin  Are you having any regular exercise?not for last 4 months  Have you practiced the PF(Kegel) exercises for 4 or more weeks? no  Thyroid checked?yes(related to hair loss, flu-like symptoms, wt gain/loss, fatigue, menopause)  Changed diet lately?yes cutting out bread and sugar    Objective:  Standing Alignment:    Cervical/Thoracic:  Forward head  Shoulder/UE:  Rounded shoulders  Lumbar deviations alignment: carries wt more in arch of feet than ankles. this can increase pressure on her bladder.  Re: Vilma Barber   :   1965       Lumbar/SI Evaluation  SI joint/Sacrum:      Sacral conclusion right:  Anterior inominate             Pelvic Dysfunction Evaluation:    External Assessment:  External assessment pelvic: pt has tender painful scars in navel region and all along her inguinal region left to right this is a long scar which is tender and painful to palpation with poor skin mobility.    PALPATION:  Palpation to pubic jt 3-5/10 the scar tissue 8/10  Decreased skin rolling in  Abdominal region.  Region  over bladder very tender 6/10  Pyramidalis muscles tender 5/10 bilaterally  Palpation to thigh adductor's very tender and the muscles are tight  General     Assessment/Plan:    Patient is a 54 year old female with pelvic and bladder complaints.    Patient has the following significant findings with corresponding treatment plan.                Diagnosis 1: Bladder urgency with incontinence Pain -  manual therapy, self management, education and home program  Impaired muscle performance - neuro re-education and home program  Decreased function - therapeutic activities and home program  Impaired posture - neuro re-education, therapeutic activities and home program    Therapy Evaluation Codes:   1) History comprised of:   Personal factors that impact the plan of care:      Anxiety, Past/current experiences and Time since onset of symptoms.     Comorbidity factors that impact the plan of care are:      Fibromyalgia and High blood pressure.     Medications impacting care: High blood pressure.  2) Examination of Body Systems comprised of:   Body structures and functions that impact the plan of care:      Cervical spine, Lumbar spine and Pelvis.   Activity limitations that impact the plan of care are:      [place] and Urge incontinence.  3) Clinical presentation characteristics are:   Evolving/Changing.  4) Decision-Making    Moderate complexity using standardized patient assessment instrument and/or measureable assessment of functional outcome.  Cumulative Therapy Evaluation is: Moderate complexity.    Previous and current functional limitations:  (See Goal Flow Sheet for this information)    Short term and Long term goals: (See Goal Flow Sheet for this information)     Communication ability:  Patient appears to be able to clearly communicate and understand verbal and written communication and follow directions correctly.  Treatment Explanation - The following has been discussed with the patient:   RX ordered/plan of care  Anticipated outcomes  Possible risks and side effects  This patient would benefit from PT intervention to resume normal activities.   Rehab potential is good.    Frequency:  2 X a month, once daily  Duration:  For 8 weeks tapering to 1 X amonth over 2 months  Discharge Plan:  Achieve all LTG.  Independent in home treatment program.  Return to previous functional level by discharge.  Reach maximal therapeutic benefit.    Please refer to the daily flowsheet for treatment today, total treatment time and time spent performing 1:1 timed codes.         Thank you for your referral.    INQUIRIES  Therapist: Dorita Amanda, PT  [facility]  [address]  [address]  [place] [address]  Phone: [phone]  Fax: [phone]

## 2020-01-22 NOTE — PROGRESS NOTES
[facility] Initial Evaluation  Subjective:         The history is provided by the patient. No  was used.   Vilma Barber being seen for bladder leaking & urgency.   Problem began 10/1/2019. Where condition occurred: at home, at work, during recreation / sport and in the community.Problem occurred: insidious   and reported as 6/10 on pain scale. General health as reported by patient is good. Pertinent medical history includes:  High blood pressure, fibromyalgia and menopausal. Other medical history details: hot flashes.     Other surgery history details: tummy tuck.  Current medications:  High blood pressure medication.   Primary job tasks include:  Computer work, repetitive tasks, pushing/pulling, prolonged sitting and prolonged standing.  Pain is described as aching, burning, cramping, sharp and stabbing and is intermittent. Pain is worse during the night (during the day is distracted with work etc). Since onset symptoms are unchanged.      Patient is [employer]. Restrictions include:  Working in normal job without restrictions.    Barriers include:  None as reported by patient.  Red flags:  None as reported by patient.  Type of problem:  Incontinence and pelvic dysfunction       Problem details: Urgency every 30 min can postphone but symptoms gets worse  Reports no constipation.   Patient reports pain:  SI joint left.  Associated symptoms:  Tingling. Symptoms are exacerbated by intercourse, coughing, jumping and sneezing and relieved by other (voids may only be a few drops).                  Patient reports onset of symptoms beginning of OCT 2109.Symptoms include urgency and urinary incontinence.  Since onset symptoms have been getting worse     Urination:  Do you leak on the way to the bathroom or with a strong urge to void? Yes    Do you leak with cough,sneeze, jumping, running?Yes   Any other activities that cause leaking? Yes   Do you have triggers that make you  feel you can't wait to go to the bathroom? Yes   what are they key in door .  Type of pad and number used per day? incont  When you leak what is the amount? Small amount    How long can you delay the need to urinate? 3 - 10 minutes. Depend pain increases  How many times do you get up to urinate at night? 1 time was 2-3 before.    Can you stop the flow of urine when on the toilet? sometimes  Is the volume of urine passed usually: small. (8 sec rule=  250ml with average bladder storing  400-600ml)    Do you strain to pass urine? sometimes  Do you have a slow or hesitant urinary stream? No  Do you have difficulty initiating the urine stream? sometimes  Is urination painful?  No    How many bladder infections have you had in last 12 months?1    Fluid intake(one glass is 8 oz or one cup) 3 glasses/day, 1 caffinated glasses/week  0 alcohol glasses/day.    Bowel habits:  Frequency of bowel movements? 7 times a week  Consistency of stool? soft, [place] Stool Scale 3-4  Do you ignore the urge to defecate? Yes  Do you strain to pass stool? No     Pelvic Pain:  Do you have any pelvic pain with intercourse, exams, use of tampons? No  Is initial penetration during intercourse painful? No  Is deeper penetration painful? Yes  Do you use lubricant?   Yes What kind? Doesn't know      Given birth? Yes Any complications? Yes  # of vaginal deliveries? 2 pg with twins has 3 children  # of C-sections 0  # of episiotomies?0.  Are you sexually active?Yes  Have you ever been worried for your physical safety? no  Any abdominal or pelvic surgeries? yes Extra skin  Are you having any regular exercise?not for last 4 months  Have you practiced the PF(Kegel) exercises for 4 or more weeks? no  Thyroid checked?yes(related to hair loss, flu-like symptoms, wt gain/loss, fatigue, menopause)  Changed diet lately?yes cutting out bread and sugar        Objective:  Standing Alignment:    Cervical/Thoracic:  Forward head  Shoulder/UE:  Rounded  shoulders  Lumbar deviations alignment: carries wt more in arch of feet than ankles. this can increase pressure on her blaadder.                  .pelvic         Lumbar/SI Evaluation                      SI joint/Sacrum:              Sacral conclusion right:  Anterior inominate                        Pelvic Dysfunction Evaluation:                  External Assessment:  External assessment pelvic: pt has tender painful scars in navel region and all along her inguinal region left to right this is a long scar which is tender and painful to palpation with poor skin mobility.                                 PALPATION:  Palpation to pubic jt 3-5/10 the scar tissue 8/10  Decreased skin rolling in  Abdominal region.  Region  over bladder very tender 6/10  Pyramidalis muscles tender 5/10 bilaterally  Palpation to thigh adductor's very tender and the muscles are tight  General     ROS    Assessment/Plan:    Patient is a 54 year old female with pelvic and bladder complaints.    Patient has the following significant findings with corresponding treatment plan.                Diagnosis 1: Bladder urgency with incontinence Pain -  manual therapy, self management, education and home program  Impaired muscle performance - neuro re-education and home program  Decreased function - therapeutic activities and home program  Impaired posture - neuro re-education, therapeutic activities and home program    Therapy Evaluation Codes:   1) History comprised of:   Personal factors that impact the plan of care:      Anxiety, Past/current experiences and Time since onset of symptoms.    Comorbidity factors that impact the plan of care are:      Fibromyalgia and High blood pressure.     Medications impacting care: High blood pressure.  2) Examination of Body Systems comprised of:   Body structures and functions that impact the plan of care:      Cervical spine, Lumbar spine and Pelvis.   Activity limitations that impact the plan of care are:       Nash and Urge incontinence.  3) Clinical presentation characteristics are:   Evolving/Changing.  4) Decision-Making    Moderate complexity using standardized patient assessment instrument and/or measureable assessment of functional outcome.  Cumulative Therapy Evaluation is: Moderate complexity.    Previous and current functional limitations:  (See Goal Flow Sheet for this information)    Short term and Long term goals: (See Goal Flow Sheet for this information)     Communication ability:  Patient appears to be able to clearly communicate and understand verbal and written communication and follow directions correctly.  Treatment Explanation - The following has been discussed with the patient:   RX ordered/plan of care  Anticipated outcomes  Possible risks and side effects  This patient would benefit from PT intervention to resume normal activities.   Rehab potential is good.    Frequency:  2 X a month, once daily  Duration:  For 8 weeks tapering to 1 X amonth over 2 months  Discharge Plan:  Achieve all LTG.  Independent in home treatment program.  Return to previous functional level by discharge.  Reach maximal therapeutic benefit.    Please refer to the daily flowsheet for treatment today, total treatment time and time spent performing 1:1 timed codes.

## 2020-01-23 NOTE — PROGRESS NOTES
[facility] Initial Evaluation  Subjective:       Pertinent medical history includes:  Fibromyalgia, menopausal and other.    Surgeries include:  None.  Current medications:  High blood pressure medication.                              Pertinent medical history includes:  Fibromyalgia, menopausal and other.    Surgeries include:  None.  Current medications:  High blood pressure medication.                                   Objective:  System    Physical Exam    General     ROS    Assessment/Plan:

## 2020-01-26 PROBLEM — R39.15 URINARY URGENCY: Status: ACTIVE | Noted: 2020-01-22

## 2020-03-26 PROBLEM — R32 INCONTINENCE IN FEMALE: Status: RESOLVED | Noted: 2020-01-22 | Resolved: 2020-03-26

## 2020-04-14 ENCOUNTER — TELEPHONE (OUTPATIENT)
Dept: FAMILY MEDICINE | Facility: CLINIC | Age: 55
End: 2020-04-14

## 2020-04-14 NOTE — TELEPHONE ENCOUNTER
Received fax from pharmacy notifying provider that patient has duplicate medication class for 2 medications: hydrochlorothiazide 25mg and Chlorthalidone 25mg.  Epic list has only chlorthalidone 25mg.  Spoke with patient she reports she is not taking both medications.  Advised if she does have both to only take chlorthalidone. Patient verbalized understanding, no further questions or concerns.     Lidia Christie RN

## 2020-08-28 DIAGNOSIS — I10 HTN, GOAL BELOW 140/90: ICD-10-CM

## 2020-08-28 RX ORDER — CHLORTHALIDONE 25 MG/1
25 TABLET ORAL DAILY
Qty: 90 TABLET | Refills: 0 | Status: SHIPPED | OUTPATIENT
Start: 2020-08-28 | End: 2020-12-01

## 2020-08-28 NOTE — TELEPHONE ENCOUNTER
Prescription approved per [facility].    Lidia Christie RN

## 2020-11-18 ENCOUNTER — OFFICE VISIT (OUTPATIENT)
Dept: ORTHOPEDICS | Facility: CLINIC | Age: 55
End: 2020-11-18
Payer: COMMERCIAL

## 2020-11-18 ENCOUNTER — ANCILLARY PROCEDURE (OUTPATIENT)
Dept: GENERAL RADIOLOGY | Facility: CLINIC | Age: 55
End: 2020-11-18
Attending: PEDIATRICS
Payer: COMMERCIAL

## 2020-11-18 VITALS
WEIGHT: 135 LBS | HEIGHT: 62 IN | SYSTOLIC BLOOD PRESSURE: 124 MMHG | BODY MASS INDEX: 24.84 KG/M2 | DIASTOLIC BLOOD PRESSURE: 72 MMHG

## 2020-11-18 DIAGNOSIS — M25.552 LEFT HIP PAIN: Primary | ICD-10-CM

## 2020-11-18 DIAGNOSIS — M54.50 LEFT LOW BACK PAIN, UNSPECIFIED CHRONICITY, UNSPECIFIED WHETHER SCIATICA PRESENT: ICD-10-CM

## 2020-11-18 DIAGNOSIS — M25.552 LEFT HIP PAIN: ICD-10-CM

## 2020-11-18 PROCEDURE — 99204 OFFICE O/P NEW MOD 45 MIN: CPT | Performed by: PEDIATRICS

## 2020-11-18 PROCEDURE — 73502 X-RAY EXAM HIP UNI 2-3 VIEWS: CPT | Mod: LT | Performed by: RADIOLOGY

## 2020-11-18 PROCEDURE — 72100 X-RAY EXAM L-S SPINE 2/3 VWS: CPT | Performed by: RADIOLOGY

## 2020-11-18 RX ORDER — CYCLOBENZAPRINE HCL 5 MG
TABLET ORAL
Qty: 30 TABLET | Refills: 0 | Status: SHIPPED | OUTPATIENT
Start: 2020-11-18 | End: 2021-03-01

## 2020-11-18 ASSESSMENT — MIFFLIN-ST. JEOR: SCORE: 1157.67

## 2020-11-18 NOTE — LETTER
11/18/2020         RE: Vilma Barber  [address] [facility]  [place] [address]        Dear Colleague,    Thank you for referring your patient, Vilma Barber, to the [facility]. Please see a copy of my visit note below.    Sports Medicine Clinic Visit    PCP: Alix Ortiz    Vilma Barber is a 55 year old female who is seen  as a self referral presenting with left side low back and hip pain.  Pain has been present for about 6 weeks.  Pain down her leg to her knee.  Worse when straightening up after being bent over as well as transitioning to sitting and standing.  Does have groin pain as well.    Occasionally with have pain with walking and standing.   Feels numbness and tingling over her entire body.      Injury: gradual onset.    **  Initially noted left low back pain, started to radiate to left hip area (more posterior). Notes worse with time.  Forward bending gets pain, can throb when sitting; pain can radiate to left thigh area with movement.  Also some pain anterior left hip, points near LLQ.  Pain to left thigh, sometimes goes to posterior lower leg, to ankle. Has not noted if to foot/toes.          Location of Pain: left low back and hip   Duration of Pain: 6 week(s)  Rating of Pain at worst: 8/10  Rating of Pain Currently: 8/10  Symptoms are better with: Aleve, Tylenol and Ibuprofen  Symptoms are worse with: flexion, extension, transitioning to standing.   Additional Features:   Positive: paresthesias   Negative: swelling, bruising, popping, grinding, catching, locking, instability, numbness, weakness, pain in other joints and systemic symptoms  Other evaluation and/or treatments so far consists of: Nothing  Prior History of related problems: denies     Social History: works for ; does require some lifting.      Vilma was asked to complete the Oswestry Low Back Disability Index.  Disability score: 60%.     Review of  Systems  Musculoskeletal: as above  Remainder of review of systems is negative including constitutional, CV, pulmonary, GI, Skin and Neurologic except as noted in HPI or medical history.    Past Medical History:   Diagnosis Date     Family history of breast cancer in first degree relative 3/25/2015    sister     GERD (gastroesophageal reflux disease)      Gilbert's disease      Headaches, tension     normal stress test 2006     Hypertension goal BP (blood pressure) < 140/90 1/21/2012     Mild major depression (H)      PSHx: noncontributory    Family History   Problem Relation Age of Onset     Heart Disease Brother      Asthma Son      Hypertension Mother      Heart Disease Mother      Glaucoma No family hx of      Macular Degeneration No family hx of      Cancer No family hx of      Diabetes No family hx of      Thyroid Disease No family hx of      Anesthesia Reaction No family hx of      Social History     Socioeconomic History     Marital status:      Spouse name: Not on file     Number of children: 3     Years of education: Not on file     Highest education level: Not on file   Occupational History     Occupation: Office work     Employer: [employer]   Social Needs     Financial resource strain: Not on file     Food insecurity     Worry: Not on file     Inability: Not on file     Transportation needs     Medical: Not on file     Non-medical: Not on file   Tobacco Use     Smoking status: Never Smoker     Smokeless tobacco: Never Used     Tobacco comment: smoke free household.   Substance and Sexual Activity     Alcohol use: Yes     Comment: occ     Drug use: No     Sexual activity: Never     Partners: Male   Lifestyle     Physical activity     Days per week: Not on file     Minutes per session: Not on file     Stress: Not on file   Relationships     Social connections     Talks on phone: Not on file     Gets together: Not on file     Attends [employer] service: Not on file     Active member of club or  "organization: Not on file     Attends meetings of clubs or organizations: Not on file     Relationship status: Not on file     Intimate partner violence     Fear of current or ex partner: Not on file     Emotionally abused: Not on file     Physically abused: Not on file     Forced sexual activity: Not on file   Other Topics Concern     Parent/sibling w/ CABG, MI or angioplasty before 65F 55M? No   Social History Narrative     Not on file       Objective  /72   Ht 1.562 m (5' 1.5\")   Wt 61.2 kg (135 lb)   BMI 25.09 kg/m      GENERAL APPEARANCE: healthy, alert and no distress   GAIT: ambulates independently, some discomfort with position changes  SKIN: no suspicious lesions or rashes  NEURO: Normal strength and tone, mentation intact and speech normal  PSYCH:  mentation appears normal and affect normal/bright  HEENT: no scleral icterus  CV: distal perfusion intact  RESP: nonlabored breathing      Low back exam:    Inspection:       no visible deformity in the low back       normal skin       normal vascular       normal lymphatic    ROM:       Flexion hands mid lower leg, some low back pain  Extension some limitation , also with pain left and radiating    Tender:       paraspinal muscles left        SI joint left    Non Tender:       remainder of lumbar spine    Strength:       hip flexion 5/5       knee extension 5/5       ankle dorsiflexion 5/5       ankle plantarflexion 5/5       dorsiflexion of the great toe 5/5    Reflexes:       patellar (L3, L4) symmetric normal       achilles tendons (S1) symmetric normal    Sensation:      grossly baseline per pt throughout lower extremities    Skin:       well perfused       capillary refill brisk    Special tests:       straight leg raise left with low back pain, to hip        straight leg raise right some left low back pain       positive (+) JUAN left        slump test left low back pain         Left hip exam    ROM:     Full active and passive ROM       Special " Tests:      neg (-) FADIR       Log roll neg          Radiology:  Visualized radiographs as noted below, and reviewed the images with the patient; if the report was available at the time of the visit, the report was reviewed as well.  No acute abnormality. Mild degenerative change.      Recent Results (from the past 48 hour(s))   XR Pelvis w Hip Left 1 View    Narrative    PELVIS AND HIP LEFT ONE VIEW November 18, 2020 11:53 AM     HISTORY: Left hip pain.      Impression    IMPRESSION: Small ossicle lateral to the left acetabular roof which  likely represent an accessory ossicle. Hip joint space width is within  normal limits. Degenerative changes at the pubis symphysis. The  osseous pelvis appears intact.    KAMRAN ELIZONDO MD   XR Lumbar Spine 2/3 Views    Narrative    LUMBAR SPINE TWO-THREE  VIEWS  11/18/2020 11:55 AM     HISTORY: Left low back pain, unspecified chronicity, unspecified  whether sciatica present    COMPARISON: None.    FINDINGS: 5 lumbar type vertebrae. Normal alignment. The vertebral  bodies are normal in height. Small anterior osteophytes are seen at  the C3-4 and C4-5 levels.. There are mild degenerative changes in the  facet joints of the lower lumbar spine..      Impression    IMPRESSION: Mild degenerative changes.    JULISSA DELEON MD         Assessment:  1. Left hip pain    2. Left low back pain, unspecified chronicity, unspecified whether sciatica present        Plan:  Discussed the assessment with the patient.  See patient instructions.  Muscle relaxant prescription.  PT offered.  We discussed consideration of additional imaging of the affected area, but this is not required currently given assessment and plan for other intervention.  She primarily prefers to monitor otherwise for now.  Follow up: will leave open ended. Contact clinic if PT order desired.  Questions answered. Discussed signs and symptoms that may indicate more serious issues; the patient was instructed to seek appropriate care  if noted. Vilma indicates understanding of these issues and agrees with the plan.      Chong Draper DO, CAQ            Patient Instructions   Left low back pain favor sacroiliac joint source, though x-rays also show mild degenerative change in the low back; facet joint source is also a possibility.  We also discussed potential for radiculopathy (nerve pinch in the back), though symptoms are not quite classic for this.  Icing, heat, over-the-counter medication if needed for soreness.  We discussed potential use of ibuprofen.  Cyclobenzaprine (Flexeril) prescription placed, this is a muscle relaxant medication.  May cause drowsiness.  Caution with work or driving.  Discussed potential for physical therapy for this.  Contact clinic if interested.  Future considerations may also include an injection, if not improving with time.  For now, focus on potential benefit from muscle relaxant medication, over-the-counter pain relievers, icing/heat.  Contact clinic if desiring to change course, or if any questions or concerns.      If you have any further questions for your physician or physician s care team you can call [phone] and use option 3 to leave a voice message. Calls received during business hours will be returned same day.          This note consists of symbols derived from keyboarding, dictation and/or voice recognition software. As a result, there may be errors in the script that have gone undetected. Please consider this when interpreting information found in this chart.          Again, thank you for allowing me to participate in the care of your patient.        Sincerely,        Chong Draper DO

## 2020-11-18 NOTE — PROGRESS NOTES
Sports Medicine Clinic Visit    PCP: Alix Ortiz    Vilma Barber is a 55 year old female who is seen  as a self referral presenting with left side low back and hip pain.  Pain has been present for about 6 weeks.  Pain down her leg to her knee.  Worse when straightening up after being bent over as well as transitioning to sitting and standing.  Does have groin pain as well.    Occasionally with have pain with walking and standing.   Feels numbness and tingling over her entire body.      Injury: gradual onset.    **  Initially noted left low back pain, started to radiate to left hip area (more posterior). Notes worse with time.  Forward bending gets pain, can throb when sitting; pain can radiate to left thigh area with movement.  Also some pain anterior left hip, points near LLQ.  Pain to left thigh, sometimes goes to posterior lower leg, to ankle. Has not noted if to foot/toes.          Location of Pain: left low back and hip   Duration of Pain: 6 week(s)  Rating of Pain at worst: 8/10  Rating of Pain Currently: 8/10  Symptoms are better with: Aleve, Tylenol and Ibuprofen  Symptoms are worse with: flexion, extension, transitioning to standing.   Additional Features:   Positive: paresthesias   Negative: swelling, bruising, popping, grinding, catching, locking, instability, numbness, weakness, pain in other joints and systemic symptoms  Other evaluation and/or treatments so far consists of: Nothing  Prior History of related problems: denies     Social History: works for ; does require some lifting.      Vilma was asked to complete the Oswestry Low Back Disability Index.  Disability score: 60%.     Review of Systems  Musculoskeletal: as above  Remainder of review of systems is negative including constitutional, CV, pulmonary, GI, Skin and Neurologic except as noted in HPI or medical history.    Past Medical History:   Diagnosis Date     Family history of breast cancer in first degree relative  3/25/2015    sister     GERD (gastroesophageal reflux disease)      Gilbert's disease      Headaches, tension     normal stress test 2006     Hypertension goal BP (blood pressure) < 140/90 1/21/2012     Mild major depression (H)      PSHx: noncontributory    Family History   Problem Relation Age of Onset     Heart Disease Brother      Asthma Son      Hypertension Mother      Heart Disease Mother      Glaucoma No family hx of      Macular Degeneration No family hx of      Cancer No family hx of      Diabetes No family hx of      Thyroid Disease No family hx of      Anesthesia Reaction No family hx of      Social History     Socioeconomic History     Marital status:      Spouse name: Not on file     Number of children: 3     Years of education: Not on file     Highest education level: Not on file   Occupational History     Occupation: Office work     Employer: [employer]   Social Needs     Financial resource strain: Not on file     Food insecurity     Worry: Not on file     Inability: Not on file     Transportation needs     Medical: Not on file     Non-medical: Not on file   Tobacco Use     Smoking status: Never Smoker     Smokeless tobacco: Never Used     Tobacco comment: smoke free household.   Substance and Sexual Activity     Alcohol use: Yes     Comment: occ     Drug use: No     Sexual activity: Never     Partners: Male   Lifestyle     Physical activity     Days per week: Not on file     Minutes per session: Not on file     Stress: Not on file   Relationships     Social connections     Talks on phone: Not on file     Gets together: Not on file     Attends Jew service: Not on file     Active member of club or organization: Not on file     Attends meetings of clubs or organizations: Not on file     Relationship status: Not on file     Intimate partner violence     Fear of current or ex partner: Not on file     Emotionally abused: Not on file     Physically abused: Not on file     Forced sexual activity:  "Not on file   Other Topics Concern     Parent/sibling w/ CABG, MI or angioplasty before 65F 55M? No   Social History Narrative     Not on file       Objective  /72   Ht 1.562 m (5' 1.5\")   Wt 61.2 kg (135 lb)   BMI 25.09 kg/m      GENERAL APPEARANCE: healthy, alert and no distress   GAIT: ambulates independently, some discomfort with position changes  SKIN: no suspicious lesions or rashes  NEURO: Normal strength and tone, mentation intact and speech normal  PSYCH:  mentation appears normal and affect normal/bright  HEENT: no scleral icterus  CV: distal perfusion intact  RESP: nonlabored breathing      Low back exam:    Inspection:       no visible deformity in the low back       normal skin       normal vascular       normal lymphatic    ROM:       Flexion hands mid lower leg, some low back pain  Extension some limitation , also with pain left and radiating    Tender:       paraspinal muscles left        SI joint left    Non Tender:       remainder of lumbar spine    Strength:       hip flexion 5/5       knee extension 5/5       ankle dorsiflexion 5/5       ankle plantarflexion 5/5       dorsiflexion of the great toe 5/5    Reflexes:       patellar (L3, L4) symmetric normal       achilles tendons (S1) symmetric normal    Sensation:      grossly baseline per pt throughout lower extremities    Skin:       well perfused       capillary refill brisk    Special tests:       straight leg raise left with low back pain, to hip        straight leg raise right some left low back pain       positive (+) JUAN left        slump test left low back pain         Left hip exam    ROM:     Full active and passive ROM       Special Tests:      neg (-) FADIR       Log roll neg          Radiology:  Visualized radiographs as noted below, and reviewed the images with the patient; if the report was available at the time of the visit, the report was reviewed as well.  No acute abnormality. Mild degenerative change.      Recent " Results (from the past 48 hour(s))   XR Pelvis w Hip Left 1 View    Narrative    PELVIS AND HIP LEFT ONE VIEW November 18, 2020 11:53 AM     HISTORY: Left hip pain.      Impression    IMPRESSION: Small ossicle lateral to the left acetabular roof which  likely represent an accessory ossicle. Hip joint space width is within  normal limits. Degenerative changes at the pubis symphysis. The  osseous pelvis appears intact.    KAMRAN ELIZONDO MD   XR Lumbar Spine 2/3 Views    Narrative    LUMBAR SPINE TWO-THREE  VIEWS  11/18/2020 11:55 AM     HISTORY: Left low back pain, unspecified chronicity, unspecified  whether sciatica present    COMPARISON: None.    FINDINGS: 5 lumbar type vertebrae. Normal alignment. The vertebral  bodies are normal in height. Small anterior osteophytes are seen at  the C3-4 and C4-5 levels.. There are mild degenerative changes in the  facet joints of the lower lumbar spine..      Impression    IMPRESSION: Mild degenerative changes.    JULISSA DELEON MD         Assessment:  1. Left hip pain    2. Left low back pain, unspecified chronicity, unspecified whether sciatica present        Plan:  Discussed the assessment with the patient.  See patient instructions.  Muscle relaxant prescription.  PT offered.  We discussed consideration of additional imaging of the affected area, but this is not required currently given assessment and plan for other intervention.  She primarily prefers to monitor otherwise for now.  Follow up: will leave open ended. Contact clinic if PT order desired.  Questions answered. Discussed signs and symptoms that may indicate more serious issues; the patient was instructed to seek appropriate care if noted. Vilma indicates understanding of these issues and agrees with the plan.      Chong Draper, DO, CAQ            Patient Instructions   Left low back pain favor sacroiliac joint source, though x-rays also show mild degenerative change in the low back; facet joint source is also  a possibility.  We also discussed potential for radiculopathy (nerve pinch in the back), though symptoms are not quite classic for this.  Icing, heat, over-the-counter medication if needed for soreness.  We discussed potential use of ibuprofen.  Cyclobenzaprine (Flexeril) prescription placed, this is a muscle relaxant medication.  May cause drowsiness.  Caution with work or driving.  Discussed potential for physical therapy for this.  Contact clinic if interested.  Future considerations may also include an injection, if not improving with time.  For now, focus on potential benefit from muscle relaxant medication, over-the-counter pain relievers, icing/heat.  Contact clinic if desiring to change course, or if any questions or concerns.      If you have any further questions for your physician or physician s care team you can call [phone] and use option 3 to leave a voice message. Calls received during business hours will be returned same day.          This note consists of symbols derived from keyboarding, dictation and/or voice recognition software. As a result, there may be errors in the script that have gone undetected. Please consider this when interpreting information found in this chart.

## 2020-11-18 NOTE — PATIENT INSTRUCTIONS
Left low back pain favor sacroiliac joint source, though x-rays also show mild degenerative change in the low back; facet joint source is also a possibility.  We also discussed potential for radiculopathy (nerve pinch in the back), though symptoms are not quite classic for this.  Icing, heat, over-the-counter medication if needed for soreness.  We discussed potential use of ibuprofen.  Cyclobenzaprine (Flexeril) prescription placed, this is a muscle relaxant medication.  May cause drowsiness.  Caution with work or driving.  Discussed potential for physical therapy for this.  Contact clinic if interested.  Future considerations may also include an injection, if not improving with time.  For now, focus on potential benefit from muscle relaxant medication, over-the-counter pain relievers, icing/heat.  Contact clinic if desiring to change course, or if any questions or concerns.      If you have any further questions for your physician or physician s care team you can call [phone] and use option 3 to leave a voice message. Calls received during business hours will be returned same day.

## 2020-11-30 DIAGNOSIS — I10 HTN, GOAL BELOW 140/90: ICD-10-CM

## 2020-12-01 RX ORDER — CHLORTHALIDONE 25 MG/1
TABLET ORAL
Qty: 90 TABLET | Refills: 0 | Status: SHIPPED | OUTPATIENT
Start: 2020-12-01 | End: 2020-12-14

## 2020-12-01 NOTE — TELEPHONE ENCOUNTER
Prescription approved per [facility] Refill Protocol.  Due for physical and labs.  Fabiola refill given per RN protocol.   Due for office visit  Pharmacy note to inform pt of office visit needed for continued medication use  Bettie Doherty RN

## 2020-12-11 NOTE — PROGRESS NOTES
"Subjective   Vilma Barber is a 55 year old female who presents to clinic today for the following health issues:  HPI          Patient is having pain that comes from the front of the stomach and radiates to her back this has been going on for about three months its a constant. She states its a burning pain and occasionally can be sharp. Patient has seen orthopedic who had a negative workup.  For the last 3 months.  If she bends then she will have a very sharp pain.   When she bends she can have burning pain.  It does in the left back and radiates to the left hip and abdomen.      -Sleeping - patient sleeps for about a half hour then wakes up then will fall asleep for a few more hours and wake up again she sometimes has tingling feeling. She has tried melatonin and tylenol pm.  When she is sleeping during the night is when she has the tingling.  Stress is perhaps related to this.  She has tried hydroxyzine as well which helps her to relax.     Blood pressure at home has been.    Her upper back burns from fibromyalgia.      Normal bowel movements.    No vaginal bleeding or urinary bleeding.            Review of Systems    ROS: 10 point ROS neg other than the symptoms noted above in the HPI.       Objective    BP (!) 146/90   Pulse 74   Temp 98  F (36.7  C) (Oral)   Resp 19   Ht 1.562 m (5' 1.5\")   Wt 64.2 kg (141 lb 8 oz)   SpO2 100%   BMI 26.30 kg/m    Body mass index is 26.3 kg/m .  Physical Exam   GENERAL APPEARANCE: healthy, alert and no distress  NECK: no adenopathy, no asymmetry, masses, or scars and thyroid normal to palpation  RESP: lungs clear to auscultation - no rales, rhonchi or wheezes  CV: regular rates and rhythm and normal S1 S2, no S3 or S4  ABDOMEN:  soft, pain in the left abdomen and flank, no HSM or masses and bowel sounds normal  SKIN: no suspicious lesions or rashes  PSYCH: mentation appears normal. and affect normal/bright  No edema       Results for orders placed or performed in " visit on 12/14/20 (from the past 24 hour(s))   *UA reflex to Microscopic and Culture ([place] [facility] (except [place] and [place])    Specimen: Midstream Urine   Result Value Ref Range    Color Urine Yellow     Appearance Urine Slightly Cloudy     Glucose Urine Negative NEG^Negative mg/dL    Bilirubin Urine Negative NEG^Negative    Ketones Urine Negative NEG^Negative mg/dL    Specific Gravity Urine 1.015 1.003 - 1.035    Blood Urine Small (A) NEG^Negative    pH Urine 6.0 5.0 - 7.0 pH    Protein Albumin Urine Negative NEG^Negative mg/dL    Urobilinogen Urine 0.2 0.2 - 1.0 EU/dL    Nitrite Urine Negative NEG^Negative    Leukocyte Esterase Urine Small (A) NEG^Negative    Source Midstream Urine    Urine Microscopic   Result Value Ref Range    WBC Urine 0 - 5 OTO5^0 - 5 /HPF    RBC Urine O - 2 OTO2^O - 2 /HPF    Squamous Epithelial /LPF Urine Few FEW^Few /LPF    Bacteria Urine Few (A) NEG^Negative /HPF   CBC with platelets   Result Value Ref Range    WBC 7.2 4.0 - 11.0 10e9/L    RBC Count 4.38 3.8 - 5.2 10e12/L    Hemoglobin 12.8 11.7 - 15.7 g/dL    Hematocrit 37.7 35.0 - 47.0 %    MCV 86 78 - 100 fl    MCH 29.2 26.5 - 33.0 pg    MCHC 34.0 31.5 - 36.5 g/dL    RDW 12.5 10.0 - 15.0 %    Platelet Count 269 150 - 450 10e9/L           Assessment & Plan     Left flank pain  Unclear etiology. Per patient instructions.   - CBC with platelets  - Comprehensive metabolic panel  - *UA reflex to Microscopic and Culture ([place] [facility] (except [facility])    HTN, goal below 140/90  Poor control.  Per patient instructions.   - chlorthalidone (HYGROTON) 25 MG tablet; Take 1 tablet (25 mg) by mouth daily    Anxiety  Likely contributing to insomnia    Psychophysiological insomnia  Per patient instructions.   - traZODone (DESYREL) 50 MG tablet; Take 1 tablet (50 mg) by mouth At Bedtime  - Urine Microscopic     BMI:   Estimated body mass index is 26.3 kg/m  as calculated from the following:    Height  "as of this encounter: 1.562 m (5' 1.5\").    Weight as of this encounter: 64.2 kg (141 lb 8 oz).           Patient Instructions   If your labs are normal then I will order a CT of the abdomen and pelvis.    Trazodone 1/2 to 1 tab at bedtime.             Return if symptoms worsen or fail to improve.    Alix Ortiz MD  [facility]    "

## 2020-12-14 ENCOUNTER — OFFICE VISIT (OUTPATIENT)
Dept: INTERNAL MEDICINE | Facility: CLINIC | Age: 55
End: 2020-12-14
Payer: COMMERCIAL

## 2020-12-14 VITALS
BODY MASS INDEX: 26.04 KG/M2 | RESPIRATION RATE: 19 BRPM | OXYGEN SATURATION: 100 % | HEIGHT: 62 IN | DIASTOLIC BLOOD PRESSURE: 90 MMHG | SYSTOLIC BLOOD PRESSURE: 146 MMHG | HEART RATE: 74 BPM | WEIGHT: 141.5 LBS | TEMPERATURE: 98 F

## 2020-12-14 DIAGNOSIS — I10 HTN, GOAL BELOW 140/90: ICD-10-CM

## 2020-12-14 DIAGNOSIS — F40.240 CLAUSTROPHOBIA: ICD-10-CM

## 2020-12-14 DIAGNOSIS — R10.9 LEFT FLANK PAIN: Primary | ICD-10-CM

## 2020-12-14 DIAGNOSIS — F41.9 ANXIETY: ICD-10-CM

## 2020-12-14 DIAGNOSIS — F51.04 PSYCHOPHYSIOLOGICAL INSOMNIA: ICD-10-CM

## 2020-12-14 LAB
ALBUMIN UR-MCNC: NEGATIVE MG/DL
APPEARANCE UR: ABNORMAL
BACTERIA #/AREA URNS HPF: ABNORMAL /HPF
BILIRUB UR QL STRIP: NEGATIVE
COLOR UR AUTO: YELLOW
ERYTHROCYTE [DISTWIDTH] IN BLOOD BY AUTOMATED COUNT: 12.5 % (ref 10–15)
GLUCOSE UR STRIP-MCNC: NEGATIVE MG/DL
HCT VFR BLD AUTO: 37.7 % (ref 35–47)
HGB BLD-MCNC: 12.8 G/DL (ref 11.7–15.7)
HGB UR QL STRIP: ABNORMAL
KETONES UR STRIP-MCNC: NEGATIVE MG/DL
LEUKOCYTE ESTERASE UR QL STRIP: ABNORMAL
MCH RBC QN AUTO: 29.2 PG (ref 26.5–33)
MCHC RBC AUTO-ENTMCNC: 34 G/DL (ref 31.5–36.5)
MCV RBC AUTO: 86 FL (ref 78–100)
NITRATE UR QL: NEGATIVE
NON-SQ EPI CELLS #/AREA URNS LPF: ABNORMAL /LPF
PH UR STRIP: 6 PH (ref 5–7)
PLATELET # BLD AUTO: 269 10E9/L (ref 150–450)
RBC # BLD AUTO: 4.38 10E12/L (ref 3.8–5.2)
RBC #/AREA URNS AUTO: ABNORMAL /HPF
SOURCE: ABNORMAL
SP GR UR STRIP: 1.01 (ref 1–1.03)
UROBILINOGEN UR STRIP-ACNC: 0.2 EU/DL (ref 0.2–1)
WBC # BLD AUTO: 7.2 10E9/L (ref 4–11)
WBC #/AREA URNS AUTO: ABNORMAL /HPF

## 2020-12-14 PROCEDURE — 99214 OFFICE O/P EST MOD 30 MIN: CPT | Performed by: INTERNAL MEDICINE

## 2020-12-14 PROCEDURE — 80053 COMPREHEN METABOLIC PANEL: CPT | Performed by: INTERNAL MEDICINE

## 2020-12-14 PROCEDURE — 85027 COMPLETE CBC AUTOMATED: CPT | Performed by: INTERNAL MEDICINE

## 2020-12-14 PROCEDURE — 36415 COLL VENOUS BLD VENIPUNCTURE: CPT | Performed by: INTERNAL MEDICINE

## 2020-12-14 PROCEDURE — 81001 URINALYSIS AUTO W/SCOPE: CPT | Performed by: INTERNAL MEDICINE

## 2020-12-14 RX ORDER — TRAZODONE HYDROCHLORIDE 50 MG/1
50 TABLET, FILM COATED ORAL AT BEDTIME
Qty: 30 TABLET | Refills: 1 | Status: SHIPPED | OUTPATIENT
Start: 2020-12-14 | End: 2021-03-22

## 2020-12-14 RX ORDER — CHLORTHALIDONE 25 MG/1
25 TABLET ORAL DAILY
Qty: 90 TABLET | Refills: 3 | Status: SHIPPED | OUTPATIENT
Start: 2020-12-14

## 2020-12-14 ASSESSMENT — MIFFLIN-ST. JEOR: SCORE: 1187.15

## 2020-12-14 ASSESSMENT — PAIN SCALES - GENERAL: PAINLEVEL: SEVERE PAIN (7)

## 2020-12-14 NOTE — PATIENT INSTRUCTIONS
If your labs are normal then I will order a CT of the abdomen and pelvis.    Trazodone 1/2 to 1 tab at bedtime.

## 2020-12-15 LAB
ALBUMIN SERPL-MCNC: 4.1 G/DL (ref 3.4–5)
ALP SERPL-CCNC: 70 U/L (ref 40–150)
ALT SERPL W P-5'-P-CCNC: 37 U/L (ref 0–50)
ANION GAP SERPL CALCULATED.3IONS-SCNC: 6 MMOL/L (ref 3–14)
AST SERPL W P-5'-P-CCNC: 21 U/L (ref 0–45)
BILIRUB SERPL-MCNC: 0.9 MG/DL (ref 0.2–1.3)
BUN SERPL-MCNC: 17 MG/DL (ref 7–30)
CALCIUM SERPL-MCNC: 9.5 MG/DL (ref 8.5–10.1)
CHLORIDE SERPL-SCNC: 97 MMOL/L (ref 94–109)
CO2 SERPL-SCNC: 34 MMOL/L (ref 20–32)
CREAT SERPL-MCNC: 0.69 MG/DL (ref 0.52–1.04)
GFR SERPL CREATININE-BSD FRML MDRD: >90 ML/MIN/{1.73_M2}
GLUCOSE SERPL-MCNC: 94 MG/DL (ref 70–99)
POTASSIUM SERPL-SCNC: 3.4 MMOL/L (ref 3.4–5.3)
PROT SERPL-MCNC: 8 G/DL (ref 6.8–8.8)
SODIUM SERPL-SCNC: 137 MMOL/L (ref 133–144)

## 2020-12-16 RX ORDER — LORAZEPAM 0.5 MG/1
0.5 TABLET ORAL ONCE
Qty: 1 TABLET | Refills: 0 | Status: SHIPPED | OUTPATIENT
Start: 2020-12-16 | End: 2020-12-16

## 2020-12-16 NOTE — RESULT ENCOUNTER NOTE
Call-  Normal electrolytes. Normal kidney function. Normal liver blood test. Proceed with CT scan as discussed in the office. Orders are in.

## 2020-12-21 ENCOUNTER — ANCILLARY PROCEDURE (OUTPATIENT)
Dept: CT IMAGING | Facility: CLINIC | Age: 55
End: 2020-12-21
Attending: INTERNAL MEDICINE
Payer: COMMERCIAL

## 2020-12-21 DIAGNOSIS — R10.9 LEFT FLANK PAIN: ICD-10-CM

## 2020-12-21 PROCEDURE — 74177 CT ABD & PELVIS W/CONTRAST: CPT | Mod: TC | Performed by: RADIOLOGY

## 2020-12-21 RX ORDER — IOPAMIDOL 755 MG/ML
500 INJECTION, SOLUTION INTRAVASCULAR ONCE
Status: COMPLETED | OUTPATIENT
Start: 2020-12-21 | End: 2020-12-21

## 2020-12-21 RX ADMIN — Medication 60 ML: at 08:24

## 2020-12-21 RX ADMIN — IOPAMIDOL 86 ML: 755 INJECTION, SOLUTION INTRAVASCULAR at 08:24

## 2020-12-21 NOTE — RESULT ENCOUNTER NOTE
Call and place order please-    There are some areas of the uterus and ovaries that do not look completely normal.  Follow up pelvic ultrasound complete with transvaginal should be done for follow up.  Indication uterine abnormality, abdomen pain

## 2020-12-24 DIAGNOSIS — R10.9 ABDOMINAL PAIN: ICD-10-CM

## 2020-12-24 DIAGNOSIS — R93.89 ABNORMAL FINDING PRESENT ON DIAGNOSTIC IMAGING OF UTERUS: Primary | ICD-10-CM

## 2020-12-28 ENCOUNTER — ANCILLARY PROCEDURE (OUTPATIENT)
Dept: ULTRASOUND IMAGING | Facility: CLINIC | Age: 55
End: 2020-12-28
Attending: INTERNAL MEDICINE
Payer: COMMERCIAL

## 2020-12-28 DIAGNOSIS — R93.89 ABNORMAL FINDING PRESENT ON DIAGNOSTIC IMAGING OF UTERUS: ICD-10-CM

## 2020-12-28 DIAGNOSIS — R93.89 ABNORMAL PELVIC ULTRASOUND: ICD-10-CM

## 2020-12-28 DIAGNOSIS — R10.2 PELVIC PAIN IN FEMALE: Primary | ICD-10-CM

## 2020-12-28 DIAGNOSIS — R10.9 ABDOMINAL PAIN: ICD-10-CM

## 2021-01-06 ENCOUNTER — OFFICE VISIT (OUTPATIENT)
Dept: OBGYN | Facility: CLINIC | Age: 56
End: 2021-01-06
Payer: COMMERCIAL

## 2021-01-06 VITALS
DIASTOLIC BLOOD PRESSURE: 89 MMHG | WEIGHT: 141 LBS | OXYGEN SATURATION: 96 % | BODY MASS INDEX: 26.21 KG/M2 | HEART RATE: 61 BPM | SYSTOLIC BLOOD PRESSURE: 133 MMHG

## 2021-01-06 DIAGNOSIS — R10.2 PELVIC PAIN IN FEMALE: Primary | ICD-10-CM

## 2021-01-06 DIAGNOSIS — M79.7 FIBROMYALGIA: ICD-10-CM

## 2021-01-06 DIAGNOSIS — N39.46 MIXED INCONTINENCE URGE AND STRESS (MALE)(FEMALE): ICD-10-CM

## 2021-01-06 DIAGNOSIS — M89.8X9 DEGENERATIVE DISORDER OF BONE: ICD-10-CM

## 2021-01-06 PROCEDURE — 99203 OFFICE O/P NEW LOW 30 MIN: CPT | Performed by: OBSTETRICS & GYNECOLOGY

## 2021-01-06 NOTE — PROGRESS NOTES
Vilma Barber is a 55 year old female, .  She presents today with pelvic pain at request of Dr. Ortiz.  She has been evaluated by Dr. Ortiz and by Sports Medicine and the encounters are reviewed.    Pressure all over the lower abdomen, left and right and center.    Pain started in the kidney radiating to the LLQ with sharp pain.  Her visit with IM reports she has had a burning pain, occasionally sharp.    At worst times, pain is 8/10.   She has had the pain for 3 to 4 months or more.  .    It can go from the back to the lower quadrants  Feels like heat going down her legs  It is worse with laying down.    She has history of bloating sensation and she continues to have this.    She had ultrasound and CT with [facility].    Tenderness on the exam with Internal Medicine.     She has been seeing some brown coloration to the urine.    She has history of JYOTHI and DI   She needs to void every 15 minutes after intercourse.   She has had upper GI and lower GI evaluation in the past.  The colonoscopy recently performed in 2014.  I do not see the upper GI evaluation.      She had the following CT scan and the patient and I reviewed the following report.     CT ABDOMEN/PELVIS WITH CONTRAST 2020 8:28 AM  CLINICAL HISTORY: Abdominal pain, acute, generalized. Pain is on the  left flank. Left flank pain.  TECHNIQUE: CT scan of the abdomen and pelvis was performed following  injection of IV contrast. Multiplanar reformats were obtained. Dose  reduction techniques were used.  CONTRAST: 86mL Isovue-370.  COMPARISON: None.  FINDINGS:   LOWER CHEST: Normal.  HEPATOBILIARY: Normal.  PANCREAS: Normal.  SPLEEN: Normal.  ADRENAL GLANDS: Normal.  KIDNEYS/BLADDER: No urolithiasis or hydronephrosis. No acute renal  abnormality. Unremarkable bladder.  BOWEL: Normal.  PELVIC ORGANS: Somewhat tubular hypodensities at the bilateral adnexa  noted. On the left side this is 1 cm series 3 image 160. This is  ill-defined on the  right but both sides can be seen on coronal series  4 image 51. Some of this process also appears to involve the  myometrium of the uterus.  ADDITIONAL FINDINGS: None.  MUSCULOSKELETAL: Normal.                                                            IMPRESSION:   1.  Somewhat tubular hypodensities at the bilateral adnexa and  myometrium. This could represent hydrosalpinx, an underlying focal  uterine lesion may be possible including fibroids or otherwise.  Recommend correlation with pelvic ultrasound.  2.  No other acute abnormality.      She had the following ultrasound and we reviewed the films and the report.      ULTRASOUND PELVIC COMPLETE WITH TRANSVAGINAL IMAGING 12/28/2020 8:34 AM  CLINICAL HISTORY: Abnormal finding present on diagnostic imaging ofnuterus; generalized abdominal and left flank pain.  TECHNIQUE: Transabdominal scans were performed. Endovaginal ultrasound nwas performed to better visualize the adnexa.  COMPARISON: CT abdomen/pelvis 12/21/2020.  FINDINGS:  UTERUS: 7.5 x 5.3 x 4.7 cm. Uterus is normal in size and position.  Scattered myometrial calcification is noted possibly old degenerated fibroids or vascular calcification. Low-attenuation elongated, possibly tubular area left uterine myometrium and/or adnexal region appears to be within the uterus on current study and is slightly hyperechoic on ultrasound. A few areas of color Doppler flow are noted  in this area and measures approximately 3.1 x 1.8 x 1.5 cm. No obvious follicles in this area but the left ovary remains in the differential.  Degenerating fibroid is also possible.  ENDOMETRIUM: 3 mm. Normal smooth endometrium.  RIGHT OVARY: Not definitely identified, but there is an area in the right adnexa with color Doppler flow and possible tiny peripheral follicles measuring 3.0 x 1.4 x 1.6 cm. This could reflect the right ovary.  LEFT OVARY: Not definitely identified possibly obscured by bowel gas.  No significant free fluid.                                                              IMPRESSION:  1.  Uterus is normal in size. No endometrial thickening. Scattered calcifications could be degenerating fibroids or could be vascular in nature.  2.  Low-attenuation area left adnexa seen on prior CT not definitely appreciated on current exam. Left myometrial structure is noted and increased in echogenicity. Question the possibility of left ovary versus degenerating fibroid. If patient has continuing pelvic pain, a follow-up pelvis MRI suggested for further assessment.  3.  Left adnexal structure possibly ovarian. No free pelvic fluid or other pelvic mass is identified.        US PELVIS COMPLETE TV7/24/2018  [facility]  Result Impression   1. Uterus has an appearance consistent with adenomyosis.  Two small   fibroids are also present - both intramural in location.  2. Right ovary is normal in appearance.   3. Left ovary is normal in appearance.     Carey Ulrich MD 7/24/2018 11:53 AM     [facility]  [address]  [address]  [facility] [address]  [phone]     Result Narrative   Gynecologic Ultrasound    Date of exam: 7/24/2018  Indication for exam: Pelvic cramping  Requesting Provider: Julia Walker NP    TECHNIQUE:   Transabdominal scan was not performed. Transvaginal scan was performed for   improved visualization of the pelvic structures.    FINDINGS:   The uterus is present, retroverted, without deviation, and measures 8.6 x   5.3 x 6.9 cm.  Heterogeneous myometrial echogenicity, a loss of clarity of the   endo-myometrial interface, radially oriented linear striations, myometrial   cysts and overall globular enlarged uterus characterized by asymmetric   thickening of the myometrium.  There is no evidence of intrauterine masses.  The endometrial thickness is 8.0 mm.  Two fibroids are visualized:  1) Left-sided, fundal, intramural fibroid measuring: 1.6 x 1.3 x 1.8 cm.  2) Calcified  fibroid located in the posterior myometrium and intramural.    This fibroid measures: 0.7 x 0.4 x 0.5 cm.    The right ovary is identified and measures 1.9 x 1.0 x 1.4 cm and appears   normal.  The left ovary is identified and measures 3.7 x 1.4 x 1.3 cm and appears   normal.  A follicle measuring: 1.8 x 0.9 x 1.0 cm is present.  Free fluid in the cul de sac: small amount of fluid.       PELVIS AND HIP LEFT ONE VIEW November 18, 2020 11:53 AM      HISTORY: Left hip pain.         Impression     IMPRESSION: Small ossicle lateral to the left acetabular roof which  likely represent an accessory ossicle. Hip joint space width is within  normal limits. Degenerative changes at the pubis symphysis. The  osseous pelvis appears intact.     KAMRAN ELIZONDO MD   XR Lumbar Spine 2/3 Views     Narrative     LUMBAR SPINE TWO-THREE  VIEWS  11/18/2020 11:55 AM      HISTORY: Left low back pain, unspecified chronicity, unspecified  whether sciatica present     COMPARISON: None.     FINDINGS: 5 lumbar type vertebrae. Normal alignment. The vertebral  bodies are normal in height. Small anterior osteophytes are seen at  the C3-4 and C4-5 levels.. There are mild degenerative changes in the  facet joints of the lower lumbar spine..        Impression     IMPRESSION: Mild degenerative changes.           Past Medical History:   Diagnosis Date     Family history of breast cancer in first degree relative 03/25/2015    sister     Fibromyalgia      GERD (gastroesophageal reflux disease)      Gilbert's disease      Headaches, tension     normal stress test 2006     Hypertension goal BP (blood pressure) < 140/90 01/21/2012     Mild major depression (H)        Past Surgical History:   Procedure Laterality Date     ZZ EMBOLIZATION UTERINE FIBROID  2010    [place]     Current Outpatient Medications   Medication Sig Dispense Refill     aspirin 81 MG tablet Take 81 mg by mouth daily       chlorthalidone (HYGROTON) 25 MG tablet Take 1 tablet (25 mg) by  mouth daily 90 tablet 3     Cholecalciferol 100 MCG (4000 UT) CAPS Take 1 capsule by mouth daily       hydrOXYzine (ATARAX) 25 MG tablet Take 1 tablet (25 mg) by mouth every 8 hours as needed for anxiety 24 tablet 1     Magnesium Gluconate 550 MG TABS Take by mouth daily       Melatonin 5 MG TBDP Take 5 mg by mouth At Bedtime       traZODone (DESYREL) 50 MG tablet Take 1 tablet (50 mg) by mouth At Bedtime 30 tablet 1     cyclobenzaprine (FLEXERIL) 5 MG tablet Take 1-2 tablets by mouth twice daily as needed for pain and spasm (Patient not taking: Reported on 1/6/2021) 30 tablet 0     omeprazole (PRILOSEC) 20 MG DR capsule Take 20 mg by mouth daily         Social History     Socioeconomic History     Marital status:      Spouse name: Not on file     Number of children: 3     Years of education: Not on file     Highest education level: Not on file   Occupational History     Occupation: Office work     Employer: [employer]   Social Needs     Financial resource strain: Not on file     Food insecurity     Worry: Not on file     Inability: Not on file     Transportation needs     Medical: Not on file     Non-medical: Not on file   Tobacco Use     Smoking status: Never Smoker     Smokeless tobacco: Never Used     Tobacco comment: smoke free household.   Substance and Sexual Activity     Alcohol use: Yes     Comment: occ     Drug use: No     Sexual activity: Never     Partners: Male   Lifestyle     Physical activity     Days per week: Not on file     Minutes per session: Not on file     Stress: Not on file   Relationships     Social connections     Talks on phone: Not on file     Gets together: Not on file     Attends Sabianism service: Not on file     Active member of club or organization: Not on file     Attends meetings of clubs or organizations: Not on file     Relationship status: Not on file     Intimate partner violence     Fear of current or ex partner: Not on file     Emotionally abused: Not on file      Physically abused: Not on file     Forced sexual activity: Not on file   Other Topics Concern     Parent/sibling w/ CABG, MI or angioplasty before 65F 55M? No   Social History Narrative     Not on file       Family History   Problem Relation Age of Onset     Heart Disease Brother      Asthma Son      Hypertension Mother      Heart Disease Mother      Glaucoma No family hx of      Macular Degeneration No family hx of      Cancer No family hx of      Diabetes No family hx of      Thyroid Disease No family hx of      Anesthesia Reaction No family hx of        Review of Systems:  10 point ROS of systems including Constitutional, Eyes, Respiratory, Cardiovascular, Gastroenterology, Genitourinary, Integumentary, Muscularskeletal, Psychiatric were all negative except for pertinent positives noted in my HPI and in the PMH.      Exam  /89 (BP Location: Right arm, Cuff Size: Adult Regular)   Pulse 61   Wt 64 kg (141 lb)   LMP 11/06/2019 (Approximate)   SpO2 96%   BMI 26.21 kg/m    General:  WNWD female, NAD  Alert  Oriented x 3  Gait:  Normal  Skin:  Normal skin turgor  HEENT:  NC/AT, EOMI  Abdomen:  Some tenderness noted through out abdomen.    Vulva: No external lesions, normal hair distribution, no adenopathy  BUS:  Normal, no masses noted  Urethra:  No hypermobility seen  Urethral meatus:  No masses noted  Vagina: Moist, pink, no abnormal discharge, well rugated, no lesions  Cervix: Smooth, pink, no visible lesions  Uterus: Normal size, anteverted, non-tender, mobile  Ovaries: No mass, non-tender, mobile  Perianal:  No masses noted  Extremities:  No clubbing, no cyanosis and no edema.      Assessment  Pelvic pain  Fibromyalgia   Stress urinary incontinence  DI/Overactive bladder  Degenerative bone changes.          Plan  The patient and I reviewed the history and symptoms and she has a complicated history.  I also reviewed the films and the imaging reports as noted above.  The recent ultrasound shows what might  be the left ovary with measurements that are consistent with the measurements of the left ovary in 2018.  It is difficult to determine the prominent calcification, but might be related with her embolization.  I suspect the CT scans with the bilateral adnexal findings are the fallopian tubes.    Her pain might also be related with the degenerative bone changes.  I don't feel that it is gyn related.  Her fibromyalgia might also contribute to the pain.    I suggest to see GI since it has been 6+ years since GI evaluation.  I also suggest to see Urology due to her DI and JYOTHI symptoms.    If the pain continues and other work up is negative, then suggest to have the MRI as radiology suggests above.  She might benefit from a pain team evaluation and management.     Taco Carbajal MD

## 2021-01-11 DIAGNOSIS — F51.04 PSYCHOPHYSIOLOGICAL INSOMNIA: ICD-10-CM

## 2021-01-12 RX ORDER — TRAZODONE HYDROCHLORIDE 50 MG/1
TABLET, FILM COATED ORAL
Qty: 30 TABLET | Refills: 1 | OUTPATIENT
Start: 2021-01-12

## 2021-03-01 ENCOUNTER — TELEPHONE (OUTPATIENT)
Dept: ORTHOPEDICS | Facility: CLINIC | Age: 56
End: 2021-03-01

## 2021-03-01 DIAGNOSIS — M25.552 LEFT HIP PAIN: ICD-10-CM

## 2021-03-01 DIAGNOSIS — M54.50 LEFT LOW BACK PAIN, UNSPECIFIED CHRONICITY, UNSPECIFIED WHETHER SCIATICA PRESENT: ICD-10-CM

## 2021-03-01 RX ORDER — CYCLOBENZAPRINE HCL 5 MG
TABLET ORAL
Qty: 30 TABLET | Refills: 0 | Status: SHIPPED | OUTPATIENT
Start: 2021-03-01 | End: 2021-05-04

## 2021-03-01 NOTE — TELEPHONE ENCOUNTER
Refill request received from [facility]  Cyclobenzapreine 5 MG  #30  Last filled 11/18/20    Please advise  Shayna Thomas MS ATC

## 2021-03-01 NOTE — TELEPHONE ENCOUNTER
rx placed.  Thanks.  Chong Draper DO, CAQ      Note for chart: appears she was not taking as of 1/6/21, per Meds and Orders section.

## 2021-03-02 ENCOUNTER — NURSE TRIAGE (OUTPATIENT)
Dept: NURSING | Facility: CLINIC | Age: 56
End: 2021-03-02

## 2021-03-02 NOTE — TELEPHONE ENCOUNTER
Upper thigh pain.x 1 week and getting so bad cannot walk.  Tried applying ice to it last night and now cannot walk.  Her  thinks it is pinched nerve but I need them to go to ER to rule out DVT.    They agree to ER.    Lou Snell RN  [facility] Nurse Advisor    Reason for Disposition    Unable to walk    Additional Information    Negative: Looks like a broken bone or dislocated joint (e.g., crooked or deformed)    Negative: Sounds like a life-threatening emergency to the triager    Negative: Followed a leg injury    Negative: Leg swelling is main symptom    Negative: Back pain radiating (shooting) into leg(s)    Negative: Knee pain is main symptom    Negative: Ankle pain is main symptom    Negative: Pregnant    Negative: Postpartum (from 0 to 6 weeks after delivery)    Negative: Chest pain    Negative: Difficulty breathing    Negative: Entire foot is cool or blue in comparison to other side    Protocols used: LEG PAIN-A-AH

## 2021-03-10 ENCOUNTER — OFFICE VISIT (OUTPATIENT)
Dept: ORTHOPEDICS | Facility: CLINIC | Age: 56
End: 2021-03-10
Payer: MEDICAID

## 2021-03-10 ENCOUNTER — TRANSFERRED RECORDS (OUTPATIENT)
Dept: HEALTH INFORMATION MANAGEMENT | Facility: CLINIC | Age: 56
End: 2021-03-10

## 2021-03-10 VITALS
SYSTOLIC BLOOD PRESSURE: 138 MMHG | DIASTOLIC BLOOD PRESSURE: 80 MMHG | BODY MASS INDEX: 26.81 KG/M2 | HEIGHT: 61 IN | WEIGHT: 142 LBS

## 2021-03-10 DIAGNOSIS — M54.41 RIGHT-SIDED LOW BACK PAIN WITH RIGHT-SIDED SCIATICA, UNSPECIFIED CHRONICITY: Primary | ICD-10-CM

## 2021-03-10 PROCEDURE — 99214 OFFICE O/P EST MOD 30 MIN: CPT | Performed by: PEDIATRICS

## 2021-03-10 RX ORDER — GABAPENTIN 300 MG/1
CAPSULE ORAL
Qty: 60 CAPSULE | Refills: 0 | Status: SHIPPED | OUTPATIENT
Start: 2021-03-10 | End: 2021-05-10

## 2021-03-10 RX ORDER — HYDROCODONE BITARTRATE AND ACETAMINOPHEN 5; 325 MG/1; MG/1
1 TABLET ORAL
Qty: 5 TABLET | Refills: 0 | Status: SHIPPED | OUTPATIENT
Start: 2021-03-10

## 2021-03-10 RX ORDER — DIAZEPAM 5 MG
TABLET ORAL
Qty: 2 TABLET | Refills: 0 | Status: SHIPPED | OUTPATIENT
Start: 2021-03-10

## 2021-03-10 ASSESSMENT — MIFFLIN-ST. JEOR: SCORE: 1181.49

## 2021-03-10 NOTE — PROGRESS NOTES
Sports Medicine Clinic Visit      Assessment:  1. Right-sided low back pain with right-sided sciatica, unspecified chronicity        Plan:  Discussed the assessment with the patient.  C/w lumbar radiculopathy.  We discussed the following: symptom treatment, activity modification/rest, imaging, rehab, injection therapy and medication. Following discussion, plan:    Topical Treatments: Ice, Heat or Topical Analgesics  Over the counter medication: prn  Prescription Medication as directed: Hydrocodone/Acetaminophen (new rx today, discussed using sparingly in acute period), Cyclobenzaprine (recent refill), valium (new rx, for MRI) and gabapentin (new rx, for neuropathic pain).   Pertinent potential adverse effect profile of prescribed medication(s) was discussed with the patient, who expressed understanding.   She may use norco in acute period, avoid overlap with gabapentin; I think gabapentin will be helpful, but she took in past for another issue and is concerned may not be helpful enough.  MRI of the lumbar spine next. She prefers to do open MRI.  Activity Modification: reviewed  Rehab: Physical Therapy: potential, await MRI  Potential lumbar ARETHA, await MRI results.  Follow up: contact pt with MRI results.  Questions answered. Discussed signs and symptoms that may indicate more serious issues; the patient was instructed to seek appropriate care if noted. Vilma indicates understanding of these issues and agrees with the plan.        See Patient Instructions  Patient Instructions   MRI lumbar spine next  Gabapentin prescription placed  norco prescription placed  Valium prescription placed for MRI  We discussed cautious use of the above medications, given adverse effects; do not use norco and gabapentin concurrently  Plan to contact you with MRI results (from [facility])    MRI order submitted thru [facility]. [facility] will contact you for scheduling.     The clinic will call you with results, if you have not heard from the clinic  within 3-4 days following your MRI please contact us at the number listed below.     If you have any further questions for your physician or physician s care team you can call [phone] and use option 3 to leave a voice message. Calls received during business hours will be returned same day.          DO KEELY Jenkins [facility]      ==========================================      PCP: Alix Ortiz Andres Barber is a 55 year old female who is seen in f/u up for Left low back pain, unspecified chronicity, unspecified whether sciatica present. Since last visit on 11/18/20 patient has begun to have pain on her right side.  She feels the pain has switched to now be on both sides, more on the right than previously on the left.  She was sent to the ER by nurse triage to r/o a blood clot.   They did not do any imaging.    Pain down the right leg to her calf.  No relief with ibuprofen and acetaminophen.    **  Last episode noted more issues with movement, on left.   This episode is constant and on right side. No benefit from ice, oral steroid from ED.  Pain right low back, to posterior hip, to right flank, and radiating distally to right LE. Radiates to distal calf, ankle. Feels like burning sensation in right LE.      Review of Systems  All other systems reviewed and are negative unless noted above.    Past Medical History:   Diagnosis Date     Family history of breast cancer in first degree relative 03/25/2015    sister     Fibromyalgia      GERD (gastroesophageal reflux disease)      Gilbert's disease      Headaches, tension     normal stress test 2006     Hypertension goal BP (blood pressure) < 140/90 01/21/2012     Mild major depression (H)      Past Surgical History:   Procedure Laterality Date     ZZHC EMBOLIZATION UTERINE FIBROID  2010    [place]     Family History   Problem Relation Age of Onset     Heart Disease Brother      Asthma Son      Hypertension  "Mother      Heart Disease Mother      Glaucoma No family hx of      Macular Degeneration No family hx of      Cancer No family hx of      Diabetes No family hx of      Thyroid Disease No family hx of      Anesthesia Reaction No family hx of      Social History     Socioeconomic History     Marital status:      Spouse name: Not on file     Number of children: 3     Years of education: Not on file     Highest education level: Not on file   Occupational History     Occupation: Office work     Employer: [employer]   Social Needs     Financial resource strain: Not on file     Food insecurity     Worry: Not on file     Inability: Not on file     Transportation needs     Medical: Not on file     Non-medical: Not on file   Tobacco Use     Smoking status: Never Smoker     Smokeless tobacco: Never Used     Tobacco comment: smoke free household.   Substance and Sexual Activity     Alcohol use: Yes     Comment: occ     Drug use: No     Sexual activity: Never     Partners: Male   Lifestyle     Physical activity     Days per week: Not on file     Minutes per session: Not on file     Stress: Not on file   Relationships     Social connections     Talks on phone: Not on file     Gets together: Not on file     Attends Denominational service: Not on file     Active member of club or organization: Not on file     Attends meetings of clubs or organizations: Not on file     Relationship status: Not on file     Intimate partner violence     Fear of current or ex partner: Not on file     Emotionally abused: Not on file     Physically abused: Not on file     Forced sexual activity: Not on file   Other Topics Concern     Parent/sibling w/ CABG, MI or angioplasty before 65F 55M? No   Social History Narrative     Not on file         Objective  /80   Ht 1.549 m (5' 1\")   Wt 64.4 kg (142 lb)   BMI 26.83 kg/m      GENERAL APPEARANCE: healthy, alert and no distress   GAIT: ambulates independently  SKIN: no suspicious lesions or " rashes  NEURO: mentation intact and speech normal  PSYCH:  mentation appears normal and affect normal/bright  HEENT: no scleral icterus  CV: distal perfusion intact  RESP: nonlabored breathing      Exam  Low back exam:    Inspection:     no visible deformity in the low back       normal skin       normal vascular       normal lymphatic    ROM: limited flexion with pain    Strength:     hip flexion 5/5       knee extension 5/5       ankle dorsiflexion 5/5       ankle plantarflexion 5/5       dorsiflexion of the great toe 5/5    Reflexes:     patellar (L3, L4) symmetric normal       achilles tendons (S1) symmetric normal      Special tests:             slump test pos right               Radiology  Previous x-ray visualized/reviewed again, with change in symptoms to right LE. Mild underlying degenerative change.      LUMBAR SPINE TWO-THREE  VIEWS  11/18/2020 11:55 AM      HISTORY: Left low back pain, unspecified chronicity, unspecified  whether sciatica present     COMPARISON: None.     FINDINGS: 5 lumbar type vertebrae. Normal alignment. The vertebral  bodies are normal in height. Small anterior osteophytes are seen at  the C3-4 and C4-5 levels.. There are mild degenerative changes in the  facet joints of the lower lumbar spine..                                                                      IMPRESSION: Mild degenerative changes.     JULISSA DELEON MD        Note: Time spent in one-on-one evaluation and discussion with the patient regarding the nature of problem, course, prior treatments, and therapeutic options, along with review of medical record (including outside sources/documentation), and completing documentation: 31 minutes.        This note consists of symbols derived from keyboarding, dictation and/or voice recognition software. As a result, there may be errors in the script that have gone undetected. Please consider this when interpreting information found in this chart.

## 2021-03-10 NOTE — PATIENT INSTRUCTIONS
MRI lumbar spine next  Gabapentin prescription placed  norco prescription placed  Valium prescription placed for MRI  We discussed cautious use of the above medications, given adverse effects; do not use norco and gabapentin concurrently  Plan to contact you with MRI results (from [facility])    MRI order submitted thru [facility]. [facility] will contact you for scheduling.     The clinic will call you with results, if you have not heard from the clinic within 3-4 days following your MRI please contact us at the number listed below.     If you have any further questions for your physician or physician s care team you can call [phone] and use option 3 to leave a voice message. Calls received during business hours will be returned same day.

## 2021-03-10 NOTE — LETTER
3/10/2021         RE: Vilma Barber  [address] [place] [address]  [place] [address]        Dear Colleague,    Thank you for referring your patient, Vilma Barber, to the [facility]. Please see a copy of my visit note below.    Sports Medicine Clinic Visit      Assessment:  1. Right-sided low back pain with right-sided sciatica, unspecified chronicity        Plan:  Discussed the assessment with the patient.  C/w lumbar radiculopathy.  We discussed the following: symptom treatment, activity modification/rest, imaging, rehab, injection therapy and medication. Following discussion, plan:    Topical Treatments: Ice, Heat or Topical Analgesics  Over the counter medication: prn  Prescription Medication as directed: Hydrocodone/Acetaminophen (new rx today, discussed using sparingly in acute period), Cyclobenzaprine (recent refill), valium (new rx, for MRI) and gabapentin (new rx, for neuropathic pain).   Pertinent potential adverse effect profile of prescribed medication(s) was discussed with the patient, who expressed understanding.   She may use norco in acute period, avoid overlap with gabapentin; I think gabapentin will be helpful, but she took in past for another issue and is concerned may not be helpful enough.  MRI of the lumbar spine next. She prefers to do open MRI.  Activity Modification: reviewed  Rehab: Physical Therapy: potential, await MRI  Potential lumbar ARETHA, await MRI results.  Follow up: contact pt with MRI results.  Questions answered. Discussed signs and symptoms that may indicate more serious issues; the patient was instructed to seek appropriate care if noted. Vilma indicates understanding of these issues and agrees with the plan.        See Patient Instructions  Patient Instructions   MRI lumbar spine next  Gabapentin prescription placed  norco prescription placed  Valium prescription placed for MRI  We discussed cautious use of the above medications,  given adverse effects; do not use norco and gabapentin concurrently  Plan to contact you with MRI results (from [facility])    MRI order submitted thru [facility]. [facility] will contact you for scheduling.     The clinic will call you with results, if you have not heard from the clinic within 3-4 days following your MRI please contact us at the number listed below.     If you have any further questions for your physician or physician s care team you can call [phone] and use option 3 to leave a voice message. Calls received during business hours will be returned same day.          Chong Draper DO  [facility]      ==========================================      PCP: Alix Ortiz HERVE Barber is a 55 year old female who is seen in f/u up for Left low back pain, unspecified chronicity, unspecified whether sciatica present. Since last visit on 11/18/20 patient has begun to have pain on her right side.  She feels the pain has switched to now be on both sides, more on the right than previously on the left.  She was sent to the ER by nurse triage to r/o a blood clot.   They did not do any imaging.    Pain down the right leg to her calf.  No relief with ibuprofen and acetaminophen.    **  Last episode noted more issues with movement, on left.   This episode is constant and on right side. No benefit from ice, oral steroid from ED.  Pain right low back, to posterior hip, to right flank, and radiating distally to right LE. Radiates to distal calf, ankle. Feels like burning sensation in right LE.      Review of Systems  All other systems reviewed and are negative unless noted above.    Past Medical History:   Diagnosis Date     Family history of breast cancer in first degree relative 03/25/2015    sister     Fibromyalgia      GERD (gastroesophageal reflux disease)      Gilbert's disease      Headaches, tension     normal stress test 2006     Hypertension goal BP (blood pressure) <  140/90 01/21/2012     Mild major depression (H)      Past Surgical History:   Procedure Laterality Date     ZZHC EMBOLIZATION UTERINE FIBROID  2010    [facility]     Family History   Problem Relation Age of Onset     Heart Disease Brother      Asthma Son      Hypertension Mother      Heart Disease Mother      Glaucoma No family hx of      Macular Degeneration No family hx of      Cancer No family hx of      Diabetes No family hx of      Thyroid Disease No family hx of      Anesthesia Reaction No family hx of      Social History     Socioeconomic History     Marital status:      Spouse name: Not on file     Number of children: 3     Years of education: Not on file     Highest education level: Not on file   Occupational History     Occupation: Office work     Employer: [employer]   Social Needs     Financial resource strain: Not on file     Food insecurity     Worry: Not on file     Inability: Not on file     Transportation needs     Medical: Not on file     Non-medical: Not on file   Tobacco Use     Smoking status: Never Smoker     Smokeless tobacco: Never Used     Tobacco comment: smoke free household.   Substance and Sexual Activity     Alcohol use: Yes     Comment: occ     Drug use: No     Sexual activity: Never     Partners: Male   Lifestyle     Physical activity     Days per week: Not on file     Minutes per session: Not on file     Stress: Not on file   Relationships     Social connections     Talks on phone: Not on file     Gets together: Not on file     Attends Shinto service: Not on file     Active member of club or organization: Not on file     Attends meetings of clubs or organizations: Not on file     Relationship status: Not on file     Intimate partner violence     Fear of current or ex partner: Not on file     Emotionally abused: Not on file     Physically abused: Not on file     Forced sexual activity: Not on file   Other Topics Concern     Parent/sibling w/ CABG, MI or angioplasty before 65F 55M? No  "  Social History Narrative     Not on file         Objective  /80   Ht 1.549 m (5' 1\")   Wt 64.4 kg (142 lb)   BMI 26.83 kg/m      GENERAL APPEARANCE: healthy, alert and no distress   GAIT: ambulates independently  SKIN: no suspicious lesions or rashes  NEURO: mentation intact and speech normal  PSYCH:  mentation appears normal and affect normal/bright  HEENT: no scleral icterus  CV: distal perfusion intact  RESP: nonlabored breathing      Exam  Low back exam:    Inspection:     no visible deformity in the low back       normal skin       normal vascular       normal lymphatic    ROM: limited flexion with pain    Strength:     hip flexion 5/5       knee extension 5/5       ankle dorsiflexion 5/5       ankle plantarflexion 5/5       dorsiflexion of the great toe 5/5    Reflexes:     patellar (L3, L4) symmetric normal       achilles tendons (S1) symmetric normal      Special tests:             slump test pos right               Radiology  Previous x-ray visualized/reviewed again, with change in symptoms to right LE. Mild underlying degenerative change.      LUMBAR SPINE TWO-THREE  VIEWS  11/18/2020 11:55 AM      HISTORY: Left low back pain, unspecified chronicity, unspecified  whether sciatica present     COMPARISON: None.     FINDINGS: 5 lumbar type vertebrae. Normal alignment. The vertebral  bodies are normal in height. Small anterior osteophytes are seen at  the C3-4 and C4-5 levels.. There are mild degenerative changes in the  facet joints of the lower lumbar spine..                                                                      IMPRESSION: Mild degenerative changes.     JULISSA DELEON MD        Note: Time spent in one-on-one evaluation and discussion with the patient regarding the nature of problem, course, prior treatments, and therapeutic options, along with review of medical record (including outside sources/documentation), and completing documentation: 31 minutes.        This note consists of " symbols derived from keyboarding, dictation and/or voice recognition software. As a result, there may be errors in the script that have gone undetected. Please consider this when interpreting information found in this chart.          Again, thank you for allowing me to participate in the care of your patient.        Sincerely,        Chong Draper, DO

## 2021-03-12 ENCOUNTER — TELEPHONE (OUTPATIENT)
Dept: ORTHOPEDICS | Facility: CLINIC | Age: 56
End: 2021-03-12

## 2021-03-12 DIAGNOSIS — M54.41 RIGHT-SIDED LOW BACK PAIN WITH RIGHT-SIDED SCIATICA, UNSPECIFIED CHRONICITY: Primary | ICD-10-CM

## 2021-03-12 NOTE — TELEPHONE ENCOUNTER
Patient: Vilma Guadarrama  D.OBaoB: 1965  Sex: Female  Phone: [phone]    [facility] MRN: [id number]  Exam Date: 03/10/2021     EXAM: MRI OF THE LUMBAR SPINE WITHOUT CONTRAST    CLINICAL INFORMATION: Female, age 55 years, with 3 weeks of lumbosacral, right buttock, and right lower extremity pain, paresthesias, and numbness. No injury. No surgery.    TECHNICAL INFORMATION: Short TR/short TE, long TR/long TE, and STIR sagittal, and short TR/short TE and long TR/long TE axial non-contrast images. Sedation: None. Imaging was performed recumbent in a 1.2 Jennie open MRI due to patient claustrophobia.    COMPARISON: A comparison lumbar spine 1.5 Jennie MRI study is available from [facility] dated November 8, 2017 demonstrating disc bulging at all levels lumbar, L4-L5 central and L5-S1 left paracentral protrusions.    INTERPRETATION:    Overview: Normal 49 degree lordosis. Normal vertebral body heights. Spondylosis, moderate from L4 caudally, mild superiorly through L2 and above L1. Minimal type II marrow changes along spondylotic endplates. No spondylolysis.    Cord: Lower thoracic spinal cord imaged has normal contour and signal. Conus medullaris terminates normally at the L1 level.    Discs: Disc dehydration (long TR) is marked from L4 caudally, mild at L1-L2, moderate elsewhere, as before. Bulging at most levels. L4-L5 4 mm broad protrusion (axial images 9), L5-S1 4 mm left paracentral protrusion (axial images 5), essentially unchanged.    Facet joints: Arthrosis mild at all lumbar levels, unchanged.    Foramina: Stenosis mild from L4 caudally, minimal superiorly through L2, above L2 foramina normal.    Specific findings at each level are as follows-    L5-S1: Mild disc height loss, circumferential disc bulge, posteriorly disc uncovered but also protruding 4 mm left paracentrally (axial images 5), slightly displacing the left S1 nerve root sleeve, mild facet arthrosis and foraminal stenosis,  unchanged.    L4-L5: Mild disc height loss, 4 mm AP broad protrusion with mild sac indentation, along with mild facet arthrosis and overgrowth resulting in mild recess and foraminal stenosis (axial images 11), both proximal L5 nerve root sleeves mildly deformed.    L3-L4: Minimal disc bulge, minimal lower foraminal stenosis, mild facet arthrosis, unchanged.    L2-L3: Minimal bulging laterally, mild facet arthrosis, unchanged.    L1-L2: Here and more superiorly posterior disc margins are either normal or minimally bulging and foramina are normal.    Ancillary: Normal hip and SI joints imaged, normal prevertebral soft tissues, minimal lumbosacral paraspinous muscle atrophy.    CONCLUSION:    1. L4-L5 moderate spondylosis with 4 mm broad protrusion, along with mild facet arthrosis, resulting in mild foraminal and recess stenosis, both proximal L5 nerve sleeves mildly deformed, unchanged since 2017.    2. L5-S1 4 mm left paracentral protrusion with minor left S1 nerve root sleeve displacement, mild facet arthrosis and foraminal stenosis, also unchanged.    GAYATRI        Electronically signed on 3/11/2021 2:39:00 PM by Mario Villar M.D.

## 2021-03-16 ENCOUNTER — TELEPHONE (OUTPATIENT)
Dept: FAMILY MEDICINE | Facility: CLINIC | Age: 56
End: 2021-03-16

## 2021-03-16 NOTE — TELEPHONE ENCOUNTER
Called and spoke with patient.  Discussed results.  She has not been using the gabapentin, but does feel that she has had some relief in her right leg.  Discussed options.  She had some questions about the injection, but would like the referral.  Would like to see PT for a few visits to learn what she can do in her piliates class.  Referrals placed.   She will contact clinic 2-3 weeks post injection    Shayna Thomas MS ATC

## 2021-03-16 NOTE — TELEPHONE ENCOUNTER
Would like someone to call her with results for her MRI asap.-- also has some questions for doctor about exercising.    Call patient with results and to answer questions   [phone]

## 2021-03-16 NOTE — TELEPHONE ENCOUNTER
Degenerative changes as noted, including with disc protrusions L4-L5 and L5-S1. On the right, issues may be stemming more from the L4-L5 level. No significant change when compared to MRI from 2017, however.  Current status?  Would offer referral for injection, anticipate ARETHA based on her symptoms at her visit.  Also advise PT, please place referral. Had discussed at her visit. Can start before or 3-4 days after injection.  If injection, pt to contact clinic 2-3 weeks after with update. Otherwise, monitor 1 month with therapy and then f/u.  I would be happy to have a visit with the patient (in person, by video, or by phone) to discuss further if that would be helpful.  Thanks.  Chong Draper DO, CAQ

## 2021-03-16 NOTE — TELEPHONE ENCOUNTER
See Sports Medicine telephone encounter 3/12/2021. Results and questions addressed. Closing encounter.    Shu Foster RN

## 2021-03-18 ENCOUNTER — TELEPHONE (OUTPATIENT)
Dept: PALLIATIVE MEDICINE | Facility: CLINIC | Age: 56
End: 2021-03-18

## 2021-03-18 NOTE — TELEPHONE ENCOUNTER
Routing to nursing to make sure you can see imaging         Patient is already scheduled, so please close once done.           Fabiola Pérez    [facility]

## 2021-03-18 NOTE — TELEPHONE ENCOUNTER
3/26/21: lumbar epidural steroid injection scheduled    Lumbar MRI was done @ [facility].  Writer unable to find imaging at [facility] portal.  It says pt has not had any imaging there.    Dr. Draper, any ideas on where to find the MRI?    Mary, RN-BSN  [facility]

## 2021-03-18 NOTE — TELEPHONE ENCOUNTER
Screening Questions for Radiology Injections:    Injection to be done at which interventional clinic site? [facility]    If [facility] location, tell patient that this procedure requires a COVID-19 lab test be done within 4 days of the procedure. Would you still like to move forward with scheduling the procedure?  Not Applicable   If YES, let patient know that someone will call them to schedule the COVID-19 test and that they will only receive a call back if the result is positive. Route to nursing to enter order.     Instruct patient to arrive as directed prior to the scheduled appointment time:    [place] minutes before      [place]: 30 minutes before; if IV needed 1 hour before     Procedure ordered by Zaira     Procedure ordered? LESI       Transforaminal Cervical ARETHA - no pain provider currently performing    As a reminder, receiving steroids can decrease your body's ability to fight infection.   Would you still like to move forward with scheduling the injection?  Yes    What insurance would patient like us to bill for this procedure? [place]       Worker's comp or MVA (motor vehicle accident) -Any injection DO NOT SCHEDULE and route to Mirlande Santos.      [employer] insurance - For SI joint injections, DO NOT SCHEDULE and route Mirlande Santos.       ALL BCBS, [employer] and [employer] C[employer]-Route to Mirlande for review DO NOT SCHEDULE      IF SCHEDULING IN [place] AND NEEDS A PA, IT IS OKAY TO SCHEDULE. [place] HANDLES THEIR OWN PA'S AFTER THE PATIENT IS SCHEDULED. PLEASE SCHEDULE AT LEAST 1 WEEK OUT SO A PA CAN BE OBTAINED.    Any chance of pregnancy? NO   If YES, do NOT schedule and route to RN [place]    Is an  needed? No     Patient has a drive home? (mandatory) YES: Informed     Is patient taking any blood thinners (i.e. plavix, coumadin, jantoven, warfarin, heparin, pradaxa or dabigatran, etc)? No   If hold needed, do NOT schedule, route to RN pool     Is patient  taking any aspirin products (includes Excedrin and Fiorinal)? Yes - Pt takes 81mg daily; instructed to hold 0 day(s) prior to procedure.      If more than 325mg/day, OK to schedule; Instruct pt to decrease to less than 325 mg for 7 days AND route to RN pool    For CERVICAL procedures, hold all aspirin products for 6 days.     Tell pt that if aspirin product is not held for 6 days, the procedure WILL BE cancelled.      Does the patient have a bleeding or clotting disorder? No     If YES, okay to schedule AND route to RN nurse pool    For any patients with platelet count <100, must be forwarded to provider    Is patient diabetic?  No  If YES, instruct them to bring their glucometer.    Does patient have an active infection or treated for one within the past week? No     Is patient currently taking any antibiotics?  No     For patients on chronic, preventative, or prophylactic antibiotics, procedures may be scheduled.     For patients on antibiotics for active or recent infection:antibiotic course must have been completed for 4 days    Is patient currently taking any steroid medications? (i.e. Prednisone, Medrol)  No     For patients on steroid medications, course must have been completed for 4 days    Is patient actively being treated for cancer or immunocompromised? No  If YES, do NOT schedule and route to RN pool     Are you able to get on and off an exam table with minimal or no assistance? Yes  If NO, do NOT schedule and route to RN pool    Are you able to roll over and lay on your stomach with minimal or no assistance? Yes  If NO, do NOT schedule and route to RN pool     Any allergies to contrast dye, iodine, shellfish, or numbing and steroid medications? No  If YES, route to RN pool AND add allergy information to appointment notes    Allergies: Patient has no known allergies.      Has the patient had a flu shot or any other vaccinations within 7 days before or after the procedure.  No     Have you recently had a  COVID vaccine or have plans to get it in the near future? No    If yes, explain that for the vaccine to work best they need to:       wait 1 week before and 1 week after getting Vaccine #1    wait 1 week before and 2 weeks after getting Vaccine #2    If patient has concerns about the timing, send to RN pool     Does patient have an MRI/CT?  YES:[facility] 2021  Check Procedure Scheduling Grid to see if required.      Was the MRI done within the last 3 years?  Yes    If yes, where was the MRI done i.e.[facility], [facility], [facility], [facility] etc? [facility]       If no, do not schedule and route to RN pool    If MRI was not done at [facility], [facility] or [facility] do NOT schedule and route to RN pool.      If pt has an imaging disc, the injection MAY be scheduled but pt has to bring disc to appt.     If they show up without the disc the injection cannot be done    Procedure Specific Instructions:      If celiac plexus block, informed patient NPO for 6 hours and that it is okay to take medications with sips of water, especially blood pressure medications  Not Applicable         If this is for a cervical procedure, informed patient that aspirin needs to be held for 6 days.   Not Applicable      If IV needed:    Do not schedule procedures requiring IV placement in the first appointment of the day or first appointment after lunch. Do NOT schedule at 0745, 0815 or 1245.     Instructed pt to arrive 30 minutes early for IV start if required. (Check Procedure Scheduling Grid)  Not Applicable    Reminders:      If you are started on any steroids or antibiotics between now and your appointment, you must contact us because the procedure may need to be cancelled.  No      For all procedures except radiofrequency ablations (RFAs) and spinal cord stimulator (SCS) trials, informed patient:    IV sedation is not provided for this procedure.  If you feel that an oral anti-anxiety medication is needed, you can discuss this further with  your referring provider or primary care provider.  The Pain Clinic provider will discuss specifics of what the procedure includes at your appointment.  Most procedures last 10-20 minutes.  We use numbing medications to help with any discomfort during the procedure.  Not Applicable      For patients 85 or older we recommend having an adult stay w/ them for the remainder of the day.       Does the patient have any questions?  NO  Fabiola Pérez  [facility]

## 2021-03-19 NOTE — TELEPHONE ENCOUNTER
Images are available from [facility].    From the report:    Patient: Vilma Guadarrama  D.LILIANB: 1965  Sex: Female  Phone: [phone]    [facility] MRN: [id number]  Exam Date: 03/10/2021      If searching by name the presence of the dash in her last name affects whether the results come up.   If unable to access, I can do so and review with Dr Murdock.  Thanks.  Chong Draper DO, CAQ

## 2021-03-19 NOTE — TELEPHONE ENCOUNTER
Tereso Chan, ATC  Mary Parker RN; [facility]   Cc: P Pain Nurse; Chong Draper, DO   Pain Mgmt staff please see 3/12/21 telephone encounter for Dr Draper.  Full MRI report is noted in telephone encounter.  Report was also sent to scanning, which should be available for review soon.     Tereso Chan ATC    ______________    Provider may need actual imaging, not just the report.    Dr. Murdock, are you okay w/ doing the epidural steroid injection with just the MRI report or do you want to see the imaging?    JOZEF Hernandez-BSN  [facility]

## 2021-03-26 ENCOUNTER — RADIOLOGY INJECTION OFFICE VISIT (OUTPATIENT)
Dept: PALLIATIVE MEDICINE | Facility: CLINIC | Age: 56
End: 2021-03-26
Attending: PEDIATRICS
Payer: MEDICAID

## 2021-03-26 VITALS
HEART RATE: 76 BPM | OXYGEN SATURATION: 98 % | RESPIRATION RATE: 16 BRPM | SYSTOLIC BLOOD PRESSURE: 140 MMHG | DIASTOLIC BLOOD PRESSURE: 91 MMHG

## 2021-03-26 DIAGNOSIS — M54.41 RIGHT-SIDED LOW BACK PAIN WITH RIGHT-SIDED SCIATICA, UNSPECIFIED CHRONICITY: ICD-10-CM

## 2021-03-26 DIAGNOSIS — M54.16 LUMBAR RADICULOPATHY: ICD-10-CM

## 2021-03-26 PROCEDURE — 64483 NJX AA&/STRD TFRM EPI L/S 1: CPT | Mod: RT | Performed by: PAIN MEDICINE

## 2021-03-26 ASSESSMENT — PAIN SCALES - GENERAL: PAINLEVEL: MODERATE PAIN (5)

## 2021-03-26 NOTE — PROGRESS NOTES
Pre procedure Diagnosis: lumbar radiculopathy, lumbar degenerative disc disease   Post procedure Diagnosis: Same  Procedure performed: lumbar transforaminal epidural steroid injection at Right L4-5, fluoroscopically guided, contrast controlled  Anesthesia: none  Complications: none  Operators: Yoni Murdock MD     Indications:   Vilma Barber is a 55 year old female.  They have a history of right-sided low back pain radiating to her right anterior leg.  Exam shows negative slump and they have tried conservative treatment including meds.    MRI reviewed  Options/alternatives, benefits and risks were discussed with the patient including bleeding, infection, tissue trauma, numbness, weakness, paralysis, spinal cord injury, radiation exposure, headache and reaction to medications. Questions were answered to her satisfaction and she agrees to proceed. Voluntary informed consent was obtained and signed.     Vitals were reviewed: Yes  There were no vitals taken for this visit.  Allergies were reviewed:  Yes   Medications were reviewed:  Yes   Pre-procedure pain score: 7/10    Procedure:  After getting informed consent, patient was brought into the procedure suite and was placed in a prone position on the procedure table.   A Pause for the Cause was performed.  Patient was prepped and draped in sterile fashion.     After identifying the right L4-5 neuroforamen, the C-arm was rotated to a right lateral oblique angle.  A total of 4ml of Lidocaine 1% was used to anesthetize the skin and the needle track at a skin entry site coaxial with the fluoroscopy beam, and overriding the superior aspect of the neuroforamen.  A 22 gauge 3.5 inch spinal needle was advanced under intermittent fluoroscopy until it entered the foramen superiorly.    The position was then inspected from anteroposterior and lateral views, and the needle adjusted appropriately.  A total of 1ml of Omnipaque-300 was injected, confirming appropriate  position, with spread into the nerve root sheath and the epidural space, with no intravascular uptake. 9ml was wasted    Then, after repeated negative aspiration, a combination of Decadron 10 mg, 0.25% bupivacaine 2 ml, diluted with 3ml of normal saline was injected.     Hemostasis was achieved, the area was cleaned, and bandaids were placed when appropriate.  The patient tolerated the procedure well, and was taken to the recovery room.    Images were saved to PACS.    Post-procedure pain score: 0/10  Follow-up includes:   -f/u phone call in one week  -f/u with referring provider    Yoni Murdock MD  [facility] [place]

## 2021-03-26 NOTE — NURSING NOTE
Pre-procedure Intake    Have you been fasting? NA    If yes, for how long? NA    Are you taking a prescribed blood thinner such as coumadin, Plavix, Xarelto?    No    If yes, when did you take your last dose? NA    Do you take aspirin?  No    If cervical procedure, have you held aspirin for 6 days?   NA    Do you have any allergies to contrast dye, iodine, steroid and/or numbing medications?  NO    Are you currently taking antibiotics or have an active infection?  NO    Have you had a fever/elevated temperature within the past week? NO    Are you currently taking oral steroids? NO    Do you have a ? Yes       Are you pregnant or breastfeeding?  NO    Are the vital signs normal?  Yes      Holly Aponte CMA ([facility])

## 2021-03-26 NOTE — NURSING NOTE
Discharge Information    IV Discontiued Time:  NA    Amount of Fluid Infused:  NA    Discharge Criteria = When patient returns to baseline or as per MD order    Consciousness:  Pt is fully awake    Circulation:  BP +/- 20% of pre-procedure level    Respiration:  Patient is able to breathe deeply    O2 Sat:  Patient is able to maintain O2 Sat >92% on room air    Activity:  Moves 4 extremities on command    Ambulation:  Patient is able to stand and walk or stand and pivot into wheelchair    Dressing:  Clean/dry or No Dressing    Notes:   Discharge instructions and AVS given to patient    Patient meets criteria for discharge?  YES    Admitted to PCU?  No    Responsible adult present to accompany patient home?  Yes    Signature/Title:    Chong Robles RN  RN Care Coordinator  [facility] [place]

## 2021-03-26 NOTE — PATIENT INSTRUCTIONS
[facility]   Procedure Discharge Instructions    Today you saw:     Dr. Yoni Murdock      You had an:  Lumbar Epidural steroid injection       Medications used:  Lidocaine   Bupivacaine   Dexamethasone Omnipaque               If you were holding your blood thinning medication, please restart taking it: N/A    Be cautious when walking. Numbness and/or weakness in the lower extremities may occur for up to 6-8 hours after the procedure due to effect of the local anesthetic    Do not drive for 6 hours. The effect of the local anesthetic could slow your reflexes.     You may resume your regular activities after 24 hours    Avoid strenuous activity for the first 24 hours    You may shower, however avoid swimming, tub baths or hot tubs for 24 hours following your procedure    You may have a mild to moderate increase in pain for several days following the injection.    It may take up to 14 days for the steroid medication to start working although you may feel the effect as early as a few days after the procedure.       You may use ice packs for 10-15 minutes, 3 to 4 times a day at the injection site for comfort    Do not use heat to painful areas for 6 to 8 hours. This will give the local anesthetic time to wear off and prevent you from accidentally burning your skin.     Unless you have been directed to avoid the use of anti-inflammatory medications (NSAIDS), you may use medications such as ibuprofen, Aleve or Tylenol for pain control if needed.     Possible side effects of steroids that you may experience include flushing, elevated blood pressure, increased appetite, mild headaches and restlessness.  All of these symptoms will get better with time.    If you experience any of the following, call the Pain Clinic during work hours (Mon-Friday 8-4:30 pm) at [phone] or the Provider Line after hours at [phone]:  -Fever over 100 degree F  -Swelling, bleeding, redness, drainage, warmth  at the injection site  -Progressive weakness or numbness in your legs   -Loss of bowel or bladder function  -Unusual new onset of pain that is not improving

## 2021-04-08 DIAGNOSIS — F51.04 PSYCHOPHYSIOLOGICAL INSOMNIA: ICD-10-CM

## 2021-04-08 RX ORDER — TRAZODONE HYDROCHLORIDE 50 MG/1
50 TABLET, FILM COATED ORAL AT BEDTIME
Qty: 30 TABLET | Refills: 1 | Status: SHIPPED | OUTPATIENT
Start: 2021-04-08 | End: 2021-08-23

## 2021-04-09 ENCOUNTER — TELEPHONE (OUTPATIENT)
Dept: ORTHOPEDICS | Facility: CLINIC | Age: 56
End: 2021-04-09

## 2021-04-09 NOTE — TELEPHONE ENCOUNTER
Called and spoke with patient.  Currently taking 1 pill twice a day, has been using one in the morning and 1 in the evening.   Does have improvement, not sure if it is form the injection or the medication.   Would like to titrate down to see if it is from the gabapentin or not.  Discussed to how to titrate down to 1 time a day, bedtime.  She believe she has 7 pills left so she will begin taking it 1 time a day over the weekend and will call us early next week if she feels she would like to continue with 1 time a day and would need a refill.    SABRINA for provider    Shayna Thomas MS ATC

## 2021-04-09 NOTE — TELEPHONE ENCOUNTER
Patient LVM. Reports she has 3 days left of the gabapentin. Wondering if she should continue taking it. Would like a call back.

## 2021-05-04 ENCOUNTER — TELEPHONE (OUTPATIENT)
Dept: ORTHOPEDICS | Facility: CLINIC | Age: 56
End: 2021-05-04

## 2021-05-04 DIAGNOSIS — M54.50 LEFT LOW BACK PAIN, UNSPECIFIED CHRONICITY, UNSPECIFIED WHETHER SCIATICA PRESENT: ICD-10-CM

## 2021-05-04 DIAGNOSIS — M25.552 LEFT HIP PAIN: ICD-10-CM

## 2021-05-04 RX ORDER — CYCLOBENZAPRINE HCL 5 MG
TABLET ORAL
Qty: 30 TABLET | Refills: 1 | Status: SHIPPED | OUTPATIENT
Start: 2021-05-04

## 2021-05-04 NOTE — TELEPHONE ENCOUNTER
Refill request received from [facility]  Cyclobenzaprine 5 MG  #30  Take 1-2 tablets by mouth twice daily as needed for pain and spasm.     Last filled 3/1/21    Shayna Thomas MS ATC

## 2021-05-10 ENCOUNTER — TELEPHONE (OUTPATIENT)
Dept: ORTHOPEDICS | Facility: CLINIC | Age: 56
End: 2021-05-10

## 2021-05-10 DIAGNOSIS — M54.41 RIGHT-SIDED LOW BACK PAIN WITH RIGHT-SIDED SCIATICA, UNSPECIFIED CHRONICITY: ICD-10-CM

## 2021-05-10 RX ORDER — GABAPENTIN 300 MG/1
300 CAPSULE ORAL 2 TIMES DAILY
Qty: 60 CAPSULE | Refills: 1 | Status: SHIPPED | OUTPATIENT
Start: 2021-05-10

## 2021-05-10 NOTE — TELEPHONE ENCOUNTER
Refill request for gabapentin 300MG  #60  Per last message, she is taking 1 in AM and 1 in PM    Last filled 3/10/21      Shayna Thomas MS ATC

## 2021-05-10 NOTE — TELEPHONE ENCOUNTER
rx placed for gabapentin 300 mg BID, #60. Will provide one refill.  Pt to contact clinic if any questions/concerns, or if desiring to discontinue medication.  Thanks.  Chong Draper DO, CAQ

## 2021-08-22 DIAGNOSIS — F51.04 PSYCHOPHYSIOLOGICAL INSOMNIA: ICD-10-CM

## 2021-08-23 RX ORDER — TRAZODONE HYDROCHLORIDE 50 MG/1
TABLET, FILM COATED ORAL
Qty: 60 TABLET | Refills: 1 | Status: SHIPPED | OUTPATIENT
Start: 2021-08-23

## 2022-08-27 NOTE — TELEPHONE ENCOUNTER
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
Patient notified of Provider's message as written.  Patient verbalized understanding.  Clement Arechiga RN    
Please let her know this is no longer on her formulary.  She will need to take ibuprofen, tylenol or Excedrin over the counter.   
Received fax from pharmacy stating patient requires Prior Authorization for [id number].     Insurance information:   Name: [employer]  Phone number: [phone]  ID number: [id number]     Initiate prior authorization or change medication?    If a prior authorization is to be initiated, please list the following:    -any medications the patient has tried and failed or any contraindications.  -is the patient currently on this medication, or has tried before?  -What is the diagnosis?  -Justification or other information that may be helpful.       Non-Formulary drug.     
Pulmonary embolism
Hyponatremia